# Patient Record
Sex: FEMALE | Race: BLACK OR AFRICAN AMERICAN | Employment: OTHER | URBAN - NONMETROPOLITAN AREA
[De-identification: names, ages, dates, MRNs, and addresses within clinical notes are randomized per-mention and may not be internally consistent; named-entity substitution may affect disease eponyms.]

---

## 2020-09-19 ENCOUNTER — HOSPITAL ENCOUNTER (EMERGENCY)
Age: 60
Discharge: HOME | End: 2020-09-19
Attending: EMERGENCY MEDICINE
Payer: MEDICAID

## 2020-09-19 VITALS
RESPIRATION RATE: 16 BRPM | TEMPERATURE: 97.6 F | WEIGHT: 178.4 LBS | SYSTOLIC BLOOD PRESSURE: 176 MMHG | HEIGHT: 65 IN | DIASTOLIC BLOOD PRESSURE: 96 MMHG | OXYGEN SATURATION: 99 % | HEART RATE: 85 BPM | BODY MASS INDEX: 29.72 KG/M2

## 2020-09-19 DIAGNOSIS — J01.11 ACUTE RECURRENT FRONTAL SINUSITIS: Primary | ICD-10-CM

## 2020-09-19 PROCEDURE — 99282 EMERGENCY DEPT VISIT SF MDM: CPT

## 2020-09-19 RX ORDER — OMEPRAZOLE 20 MG/1
20 CAPSULE, DELAYED RELEASE ORAL
COMMUNITY

## 2020-09-19 RX ORDER — GLIPIZIDE 2.5 MG/1
2.5 TABLET, EXTENDED RELEASE ORAL DAILY
COMMUNITY
End: 2021-12-07

## 2020-09-19 RX ORDER — LOSARTAN POTASSIUM 100 MG/1
100 TABLET ORAL DAILY
COMMUNITY

## 2020-09-19 RX ORDER — EZETIMIBE 10 MG/1
10 TABLET ORAL DAILY
COMMUNITY

## 2020-09-19 RX ORDER — ACARBOSE 100 MG/1
100 TABLET ORAL
COMMUNITY

## 2020-09-19 RX ORDER — ATORVASTATIN CALCIUM 80 MG/1
80 TABLET, FILM COATED ORAL DAILY
COMMUNITY

## 2020-09-19 NOTE — ED PROVIDER NOTES
Patient states she has been bothered with sinus situs for over a week. Postnasal drip and some scratchy feeling in the back of her throat. Sinus Pain    Associated symptoms include congestion. Pertinent negatives include no ear pain, no sinus pressure, no sore throat, no cough, no shortness of breath, no rhinorrhea and no chest pain. Past Medical History:   Diagnosis Date    Allergies     Diabetes ([address]     Hypercholesteremia     Hypertension        Past Surgical History:   Procedure Laterality Date    HX BACK SURGERY      HX  SECTION           No family history on file.     Social History     Socioeconomic History    Marital status: SINGLE     Spouse name: Not on file    Number of children: Not on file    Years of education: Not on file    Highest education level: Not on file   Occupational History    Not on file   Social Needs    Financial resource strain: Not on file    Food insecurity     Worry: Not on file     Inability: Not on file    Transportation needs     Medical: Not on file     Non-medical: Not on file   Tobacco Use    Smoking status: Never Smoker    Smokeless tobacco: Never Used   Substance and Sexual Activity    Alcohol use: Not Currently    Drug use: Never    Sexual activity: Not on file   Lifestyle    Physical activity     Days per week: Not on file     Minutes per session: Not on file    Stress: Not on file   Relationships    Social connections     Talks on phone: Not on file     Gets together: Not on file     Attends Scientology service: Not on file     Active member of club or organization: Not on file     Attends meetings of clubs or organizations: Not on file     Relationship status: Not on file    Intimate partner violence     Fear of current or ex partner: Not on file     Emotionally abused: Not on file     Physically abused: Not on file     Forced sexual activity: Not on file   Other Topics Concern    Not on file   Social History Narrative    Not on file         ALLERGIES: Amoxicillin    Review of Systems   Constitutional: Negative. HENT: Positive for congestion and sinus pain. Negative for dental problem, drooling, ear discharge, ear pain, facial swelling, hearing loss, mouth sores, nosebleeds, postnasal drip, rhinorrhea, sinus pressure, sneezing, sore throat, tinnitus, trouble swallowing and voice change. Eyes: Negative for photophobia, pain, discharge, redness, itching and visual disturbance. Respiratory: Negative for apnea, cough, choking, chest tightness, shortness of breath, wheezing and stridor. Cardiovascular: Negative. Negative for chest pain, palpitations and leg swelling. Gastrointestinal: Negative. Endocrine: Negative. Genitourinary: Negative. Musculoskeletal: Negative. Skin: Negative. Allergic/Immunologic: Negative. Neurological: Negative. Hematological: Negative. Psychiatric/Behavioral: Negative. Vitals:    09/19/20 1103   BP: (!) 176/96   Pulse: 85   Resp: 16   Temp: 97.6 °F (36.4 °C)   SpO2: 99%   Weight: 80.9 kg (178 lb 6.4 oz)   Height: 5' 5\" (1.651 m)            Physical Exam  Vitals signs and nursing note reviewed. Constitutional:       Appearance: Normal appearance. HENT:      Head: Normocephalic. Right Ear: Tympanic membrane normal.      Left Ear: Tympanic membrane normal.      Nose: Congestion present. No rhinorrhea. Mouth/Throat:      Mouth: Mucous membranes are moist.      Pharynx: Oropharynx is clear. Eyes:      Extraocular Movements: Extraocular movements intact. Pupils: Pupils are equal, round, and reactive to light. Neck:      Musculoskeletal: Normal range of motion and neck supple. Cardiovascular:      Rate and Rhythm: Normal rate. Pulses: Normal pulses. Pulmonary:      Effort: Pulmonary effort is normal.   Abdominal:      General: Abdomen is flat. Musculoskeletal: Normal range of motion. Skin:     General: Skin is warm.       Capillary Refill: Capillary refill takes less than 2 seconds. Neurological:      Mental Status: She is alert and oriented to person, place, and time.    Psychiatric:         Mood and Affect: Mood normal.          MDM         Procedures

## 2020-11-19 ENCOUNTER — TRANSCRIBE ORDER (OUTPATIENT)
Dept: SCHEDULING | Age: 60
End: 2020-11-19

## 2020-11-19 DIAGNOSIS — Z12.31 VISIT FOR SCREENING MAMMOGRAM: Primary | ICD-10-CM

## 2021-05-15 ENCOUNTER — HOSPITAL ENCOUNTER (EMERGENCY)
Age: 61
Discharge: HOME HEALTH | End: 2021-05-15
Attending: EMERGENCY MEDICINE
Payer: MEDICARE

## 2021-05-15 VITALS
HEART RATE: 80 BPM | OXYGEN SATURATION: 100 % | TEMPERATURE: 97.9 F | SYSTOLIC BLOOD PRESSURE: 193 MMHG | RESPIRATION RATE: 17 BRPM | DIASTOLIC BLOOD PRESSURE: 94 MMHG

## 2021-05-15 DIAGNOSIS — R51.9 HEADACHE BEHIND THE EYES: Primary | ICD-10-CM

## 2021-05-15 PROCEDURE — 99283 EMERGENCY DEPT VISIT LOW MDM: CPT

## 2021-05-15 RX ORDER — ACETAMINOPHEN 500 MG
500 TABLET ORAL
Status: DISCONTINUED | OUTPATIENT
Start: 2021-05-15 | End: 2021-05-15 | Stop reason: HOSPADM

## 2021-05-15 RX ORDER — BISACODYL 5 MG
5 TABLET, DELAYED RELEASE (ENTERIC COATED) ORAL DAILY PRN
COMMUNITY
End: 2021-12-07

## 2021-05-15 NOTE — ED TRIAGE NOTES
Pt reports HA that started after getting a new eyeglass RX a couple of days ago. Pt also c/o HTN. Pt states she takes her BP meds daily. Pt denies any other symptoms.

## 2021-05-15 NOTE — ED PROVIDER NOTES
HPI     Patient is a 64 y.o. female DECLINED  BLACK/ who presents to the ER with a chief complaint of headache since when she got new glasses. She plan to see the doctor Monday. Patient has HTN. Patient denies SOB and CP. Patient denies hx of headache. Patient reports she has been stressing lately. 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 Chief Complaint   Patient presents with    Headache      Past Medical History:   Diagnosis Date    Allergies     [address] [address]     Hypercholesteremia     Hypertension        Past Surgical History:   Procedure Laterality Date    HX BACK SURGERY      HX  SECTION           History reviewed. No pertinent family history.     Social History     Socioeconomic History    Marital status: SINGLE     Spouse name: Not on file    Number of children: Not on file    Years of education: Not on file    Highest education level: Not on file   Occupational History    Not on file   Social Needs    Financial resource strain: Not on file    Food insecurity     Worry: Not on file     Inability: Not on file    Transportation needs     Medical: Not on file     Non-medical: Not on file   Tobacco Use    Smoking status: Never Smoker    Smokeless tobacco: Never Used   Substance and Sexual Activity    Alcohol use: Not Currently    Drug use: Never    Sexual activity: Not on file   Lifestyle    Physical activity     Days per week: Not on file     Minutes per session: Not on file    Stress: Not on file   Relationships    Social connections     Talks on phone: Not on file     Gets together: Not on file     Attends Worship service: Not on file     Active member of club or organization: Not on file     Attends meetings of clubs or organizations: Not on file     Relationship status: Not on file    Intimate partner violence     Fear of current or ex partner: Not on file     Emotionally abused: Not on file     Physically abused: Not on file     Forced sexual activity: Not on file   Other Topics Concern    Not on file   Social History Narrative    Not on file         ALLERGIES: Amoxicillin    Review of Systems   Constitutional: Negative. HENT: Negative. Eyes: Positive for itching. Respiratory: Negative. Cardiovascular: Negative. Gastrointestinal: Negative. Endocrine: Negative. Genitourinary: Negative. Musculoskeletal: Negative. Skin: Negative. Allergic/Immunologic: Negative. Neurological: Positive for headaches. Hematological: Negative. Psychiatric/Behavioral: Negative. Vitals:    05/15/21 1726   BP: (!) 193/94   Pulse: 80   Resp: 17   Temp: 97.9 °F (36.6 °C)   SpO2: 100%            Physical Exam  Vitals signs and nursing note reviewed. Constitutional:       Appearance: Normal appearance. She is normal weight. HENT:      Head: Normocephalic. Nose: Nose normal.   Eyes:      Pupils: Pupils are equal, round, and reactive to light. Neck:      Musculoskeletal: Normal range of motion. Cardiovascular:      Rate and Rhythm: Normal rate and regular rhythm. Pulmonary:      Effort: Pulmonary effort is normal.   Abdominal:      General: Abdomen is flat. Bowel sounds are normal.      Palpations: Abdomen is soft. Musculoskeletal: Normal range of motion. Skin:     General: Skin is warm. Capillary Refill: Capillary refill takes less than 2 seconds. Neurological:      General: No focal deficit present. Mental Status: She is alert. Psychiatric:         Mood and Affect: Mood normal.     Review of Records: nurses notes. MDM       Procedures    ICD-10-CM ICD-9-CM    1. Headache behind the eyes  R51.9 784.0      Dx 1. headaches    2.  Essential HTN    Plan: patient discharged home with follow-up with MD and eye doctor on MOnday

## 2021-05-17 ENCOUNTER — HOSPITAL ENCOUNTER (EMERGENCY)
Age: 61
Discharge: HOME | End: 2021-05-17
Attending: EMERGENCY MEDICINE
Payer: MEDICAID

## 2021-05-17 ENCOUNTER — APPOINTMENT (OUTPATIENT)
Dept: CT IMAGING | Age: 61
End: 2021-05-17
Attending: EMERGENCY MEDICINE
Payer: MEDICAID

## 2021-05-17 VITALS
OXYGEN SATURATION: 99 % | HEART RATE: 62 BPM | DIASTOLIC BLOOD PRESSURE: 74 MMHG | RESPIRATION RATE: 16 BRPM | TEMPERATURE: 98.4 F | SYSTOLIC BLOOD PRESSURE: 116 MMHG | HEIGHT: 65 IN | BODY MASS INDEX: 29.66 KG/M2 | WEIGHT: 178 LBS

## 2021-05-17 DIAGNOSIS — F41.1 ANXIETY STATE: ICD-10-CM

## 2021-05-17 DIAGNOSIS — I10 ESSENTIAL HYPERTENSION: ICD-10-CM

## 2021-05-17 DIAGNOSIS — R42 DIZZINESS: Primary | ICD-10-CM

## 2021-05-17 LAB
ALBUMIN SERPL-MCNC: 4 G/DL (ref 3.5–5)
ALBUMIN/GLOB SERPL: 0.9 {RATIO} (ref 1.1–2.2)
ALP SERPL-CCNC: 131 U/L (ref 45–117)
ALT SERPL-CCNC: 28 U/L (ref 12–78)
ANION GAP SERPL CALC-SCNC: 7 MMOL/L (ref 5–15)
APPEARANCE UR: CLEAR
AST SERPL W P-5'-P-CCNC: 21 U/L (ref 15–37)
BASOPHILS # BLD: 0 K/UL (ref 0–0.2)
BASOPHILS NFR BLD: 0 % (ref 0–2.5)
BILIRUB SERPL-MCNC: 0.8 MG/DL (ref 0.2–1)
BILIRUB UR QL: NEGATIVE
BUN SERPL-MCNC: 14 MG/DL (ref 6–20)
BUN/CREAT SERPL: 12 (ref 12–20)
CA-I BLD-MCNC: 9.8 MG/DL (ref 8.5–10.1)
CHLORIDE SERPL-SCNC: 103 MMOL/L (ref 97–108)
CO2 SERPL-SCNC: 30 MMOL/L (ref 21–32)
COLOR UR: NORMAL
CREAT SERPL-MCNC: 1.15 MG/DL (ref 0.55–1.02)
EOSINOPHIL # BLD: 0.1 K/UL (ref 0–0.7)
EOSINOPHIL NFR BLD: 2 % (ref 0.9–2.9)
ERYTHROCYTE [DISTWIDTH] IN BLOOD BY AUTOMATED COUNT: 15 % (ref 11.5–14.5)
GLOBULIN SER CALC-MCNC: 4.3 G/DL (ref 2–4)
GLUCOSE SERPL-MCNC: 127 MG/DL (ref 65–100)
GLUCOSE UR STRIP.AUTO-MCNC: NEGATIVE MG/DL
HCT VFR BLD AUTO: 35.4 % (ref 36–46)
HGB BLD-MCNC: 11.8 G/DL (ref 13.5–17.5)
HGB UR QL STRIP: NEGATIVE
KETONES UR QL STRIP.AUTO: NEGATIVE MG/DL
LEUKOCYTE ESTERASE UR QL STRIP.AUTO: NEGATIVE
LYMPHOCYTES # BLD: 3.5 K/UL (ref 1–4.8)
LYMPHOCYTES NFR BLD: 47 % (ref 20.5–51.1)
MCH RBC QN AUTO: 29.8 PG (ref 31–34)
MCHC RBC AUTO-ENTMCNC: 33.3 G/DL (ref 31–36)
MCV RBC AUTO: 89.6 FL (ref 80–100)
MONOCYTES # BLD: 0.8 K/UL (ref 0.2–2.4)
MONOCYTES NFR BLD: 11 % (ref 1.7–9.3)
NEUTS SEG # BLD: 2.9 K/UL (ref 1.8–7.7)
NEUTS SEG NFR BLD: 40 % (ref 42–75)
NITRITE UR QL STRIP.AUTO: NEGATIVE
NRBC # BLD: 0.01 K/UL
NRBC BLD-RTO: 0.1 PER 100 WBC
PH UR STRIP: 6 [PH] (ref 5–8)
PLATELET # BLD AUTO: 200 K/UL (ref 150–400)
PMV BLD AUTO: 8.4 FL (ref 6.5–11.5)
POTASSIUM SERPL-SCNC: 3.6 MMOL/L (ref 3.5–5.1)
PROT SERPL-MCNC: 8.3 G/DL (ref 6.4–8.2)
PROT UR STRIP-MCNC: NEGATIVE MG/DL
RBC # BLD AUTO: 3.96 M/UL (ref 4.5–5.9)
SODIUM SERPL-SCNC: 140 MMOL/L (ref 136–145)
SP GR UR REFRACTOMETRY: 1.02 (ref 1–1.03)
TROPONIN I SERPL-MCNC: <0.05 NG/ML
UROBILINOGEN UR QL STRIP.AUTO: 0.2 EU/DL (ref 0.2–1)
WBC # BLD AUTO: 7.3 K/UL (ref 4.4–11.3)

## 2021-05-17 PROCEDURE — 85025 COMPLETE CBC W/AUTO DIFF WBC: CPT

## 2021-05-17 PROCEDURE — 96374 THER/PROPH/DIAG INJ IV PUSH: CPT

## 2021-05-17 PROCEDURE — 74011250637 HC RX REV CODE- 250/637: Performed by: EMERGENCY MEDICINE

## 2021-05-17 PROCEDURE — 80053 COMPREHEN METABOLIC PANEL: CPT

## 2021-05-17 PROCEDURE — 74011250636 HC RX REV CODE- 250/636: Performed by: EMERGENCY MEDICINE

## 2021-05-17 PROCEDURE — 81003 URINALYSIS AUTO W/O SCOPE: CPT

## 2021-05-17 PROCEDURE — 84484 ASSAY OF TROPONIN QUANT: CPT

## 2021-05-17 PROCEDURE — 99284 EMERGENCY DEPT VISIT MOD MDM: CPT

## 2021-05-17 PROCEDURE — 93005 ELECTROCARDIOGRAM TRACING: CPT

## 2021-05-17 RX ORDER — FLUTICASONE PROPIONATE 50 MCG
2 SPRAY, SUSPENSION (ML) NASAL DAILY
COMMUNITY

## 2021-05-17 RX ORDER — LORAZEPAM 2 MG/ML
1 INJECTION INTRAMUSCULAR
Status: COMPLETED | OUTPATIENT
Start: 2021-05-17 | End: 2021-05-17

## 2021-05-17 RX ORDER — HYDRALAZINE HYDROCHLORIDE 25 MG/1
50 TABLET, FILM COATED ORAL
Status: COMPLETED | OUTPATIENT
Start: 2021-05-17 | End: 2021-05-17

## 2021-05-17 RX ORDER — ERGOCALCIFEROL 1.25 MG/1
50000 CAPSULE ORAL
COMMUNITY

## 2021-05-17 RX ORDER — GUAIFENESIN 100 MG/5ML
81 LIQUID (ML) ORAL DAILY
COMMUNITY
End: 2022-09-03

## 2021-05-17 RX ORDER — LISINOPRIL 10 MG/1
10 TABLET ORAL
Status: COMPLETED | OUTPATIENT
Start: 2021-05-17 | End: 2021-05-17

## 2021-05-17 RX ORDER — CLONIDINE HYDROCHLORIDE 0.1 MG/1
0.1 TABLET ORAL
Status: COMPLETED | OUTPATIENT
Start: 2021-05-17 | End: 2021-05-17

## 2021-05-17 RX ORDER — LORATADINE 10 MG/1
10 TABLET ORAL
COMMUNITY

## 2021-05-17 RX ORDER — DOCUSATE SODIUM 100 MG/1
100 CAPSULE, LIQUID FILLED ORAL
COMMUNITY

## 2021-05-17 RX ADMIN — LORAZEPAM 1 MG: 2 INJECTION INTRAMUSCULAR; INTRAVENOUS at 21:46

## 2021-05-17 RX ADMIN — CLONIDINE HYDROCHLORIDE 0.1 MG: 0.1 TABLET ORAL at 21:45

## 2021-05-17 RX ADMIN — HYDRALAZINE HYDROCHLORIDE 50 MG: 25 TABLET, FILM COATED ORAL at 21:45

## 2021-05-17 RX ADMIN — LISINOPRIL 10 MG: 10 TABLET ORAL at 21:12

## 2021-05-18 LAB
ATRIAL RATE: 91 BPM
CALCULATED P AXIS, ECG09: 66 DEGREES
CALCULATED R AXIS, ECG10: -20 DEGREES
CALCULATED T AXIS, ECG11: 65 DEGREES
DIAGNOSIS, 93000: NORMAL
P-R INTERVAL, ECG05: 180 MS
Q-T INTERVAL, ECG07: 361 MS
QRS DURATION, ECG06: 98 MS
QTC CALCULATION (BEZET), ECG08: 447 MS
VENTRICULAR RATE, ECG03: 92 BPM

## 2021-05-18 NOTE — ED NOTES
Patient refusing Head CT at this time. Dr. Velarde Esters at bedside to educate patient on importance of head CT and the risks of refusing head CT, that could include death. Patient still refusing head CT at this time.

## 2021-05-18 NOTE — ED TRIAGE NOTES
Patient presents to ED reporting hypertension and dizziness. Patient was seen here two days ago for the same. Patient denies chest pain, shortness of breath, and all other symptoms at this time. Patient is tearful and anxious appearing.

## 2021-05-18 NOTE — ED PROVIDER NOTES
Patient presents with complaint of dizziness and hypertension. She was seen 2 days ago for same , but reports worsening symptoms in past 24 hours. No fever , chills chest pain or shortness of breath. Duration:  3 days    Intensity: severe    Modified by: nothing               Past Medical History:   Diagnosis Date    Allergies     Diabetes ([address]     Hypercholesteremia     Hypertension        Past Surgical History:   Procedure Laterality Date    HX BACK SURGERY      HX  SECTION           History reviewed. No pertinent family history.     Social History     Socioeconomic History    Marital status: SINGLE     Spouse name: Not on file    Number of children: Not on file    Years of education: Not on file    Highest education level: Not on file   Occupational History    Not on file   Social Needs    Financial resource strain: Not on file    Food insecurity     Worry: Not on file     Inability: Not on file    Transportation needs     Medical: Not on file     Non-medical: Not on file   Tobacco Use    Smoking status: Never Smoker    Smokeless tobacco: Never Used   Substance and Sexual Activity    Alcohol use: Not Currently    Drug use: Never    Sexual activity: Not on file   Lifestyle    Physical activity     Days per week: Not on file     Minutes per session: Not on file    Stress: Not on file   Relationships    Social connections     Talks on phone: Not on file     Gets together: Not on file     Attends Spiritism service: Not on file     Active member of club or organization: Not on file     Attends meetings of clubs or organizations: Not on file     Relationship status: Not on file    Intimate partner violence     Fear of current or ex partner: Not on file     Emotionally abused: Not on file     Physically abused: Not on file     Forced sexual activity: Not on file   Other Topics Concern    Not on file   Social History Narrative    Not on file         ALLERGIES: Nsaids (non-steroidal anti-inflammatory drug) and Amoxicillin    Review of Systems   Constitutional: Positive for diaphoresis, fatigue and fever. HENT: Negative. Eyes: Negative. Respiratory: Positive for chest tightness and shortness of breath. Cardiovascular: Positive for chest pain. Gastrointestinal: Positive for abdominal pain, nausea and vomiting. Endocrine: Negative. Genitourinary: Negative. Skin: Negative. Allergic/Immunologic: Negative. Neurological: Positive for dizziness. Hematological: Negative. Psychiatric/Behavioral: Negative. All other systems reviewed and are negative. Vitals:    05/17/21 2049   BP: (!) 178/83   Pulse: 80   Resp: 18   Temp: 98.4 °F (36.9 °C)   SpO2: 100%   Weight: 80.7 kg (178 lb)   Height: 5' 5\" (1.651 m)            Physical Exam  Vitals signs and nursing note reviewed. Constitutional:       Appearance: She is well-developed. HENT:      Head: Normocephalic and atraumatic. Neck:      Musculoskeletal: Normal range of motion and neck supple. Cardiovascular:      Rate and Rhythm: Normal rate and regular rhythm. Heart sounds: Normal heart sounds. Pulmonary:      Breath sounds: Normal breath sounds. Abdominal:      General: Bowel sounds are normal.      Palpations: Abdomen is soft. Musculoskeletal: Normal range of motion. Neurological:      General: No focal deficit present. Mental Status: She is alert. Psychiatric:         Mood and Affect: Mood normal.         Behavior: Behavior normal.          MDM  Number of Diagnoses or Management Options  Risk of Complications, Morbidity, and/or Mortality  Presenting problems: moderate  Diagnostic procedures: moderate  Management options: moderate  General comments: EKG : sinus rhythm 92 LAD. No acute ST changes.       Patient Progress  Patient progress: stable         Procedures

## 2021-05-25 ENCOUNTER — HOSPITAL ENCOUNTER (EMERGENCY)
Age: 61
Discharge: HOME | End: 2021-05-25
Attending: EMERGENCY MEDICINE
Payer: MEDICARE

## 2021-05-25 VITALS
DIASTOLIC BLOOD PRESSURE: 78 MMHG | SYSTOLIC BLOOD PRESSURE: 143 MMHG | TEMPERATURE: 98 F | HEART RATE: 60 BPM | OXYGEN SATURATION: 100 % | RESPIRATION RATE: 14 BRPM

## 2021-05-25 DIAGNOSIS — I10 HYPERTENSION, UNSPECIFIED TYPE: ICD-10-CM

## 2021-05-25 DIAGNOSIS — F41.1 ANXIETY STATE: Primary | ICD-10-CM

## 2021-05-25 LAB
ANION GAP SERPL CALC-SCNC: 9 MMOL/L (ref 5–15)
BASOPHILS # BLD: 0 K/UL (ref 0–0.2)
BASOPHILS NFR BLD: 1 % (ref 0–2.5)
BUN SERPL-MCNC: 18 MG/DL (ref 6–20)
BUN/CREAT SERPL: 16 (ref 12–20)
CA-I BLD-MCNC: 9.4 MG/DL (ref 8.5–10.1)
CHLORIDE SERPL-SCNC: 101 MMOL/L (ref 97–108)
CO2 SERPL-SCNC: 29 MMOL/L (ref 21–32)
CREAT SERPL-MCNC: 1.15 MG/DL (ref 0.55–1.02)
EOSINOPHIL # BLD: 0.1 K/UL (ref 0–0.7)
EOSINOPHIL NFR BLD: 2 % (ref 0.9–2.9)
ERYTHROCYTE [DISTWIDTH] IN BLOOD BY AUTOMATED COUNT: 14.5 % (ref 11.5–14.5)
GLUCOSE SERPL-MCNC: 140 MG/DL (ref 65–100)
HCT VFR BLD AUTO: 35 % (ref 36–46)
HGB BLD-MCNC: 11.7 G/DL (ref 13.5–17.5)
LYMPHOCYTES # BLD: 2.5 K/UL (ref 1–4.8)
LYMPHOCYTES NFR BLD: 47 % (ref 20.5–51.1)
MCH RBC QN AUTO: 29.8 PG (ref 31–34)
MCHC RBC AUTO-ENTMCNC: 33.5 G/DL (ref 31–36)
MCV RBC AUTO: 88.9 FL (ref 80–100)
MONOCYTES # BLD: 0.5 K/UL (ref 0.2–2.4)
MONOCYTES NFR BLD: 10 % (ref 1.7–9.3)
NEUTS SEG # BLD: 2.1 K/UL (ref 1.8–7.7)
NEUTS SEG NFR BLD: 40 % (ref 42–75)
NRBC # BLD: 0.01 K/UL
NRBC BLD-RTO: 0.2 PER 100 WBC
PLATELET # BLD AUTO: 190 K/UL (ref 150–400)
PMV BLD AUTO: 8.6 FL (ref 6.5–11.5)
POTASSIUM SERPL-SCNC: 4.2 MMOL/L (ref 3.5–5.1)
RBC # BLD AUTO: 3.93 M/UL (ref 4.5–5.9)
SODIUM SERPL-SCNC: 139 MMOL/L (ref 136–145)
WBC # BLD AUTO: 5.2 K/UL (ref 4.4–11.3)

## 2021-05-25 PROCEDURE — 80048 BASIC METABOLIC PNL TOTAL CA: CPT

## 2021-05-25 PROCEDURE — 85025 COMPLETE CBC W/AUTO DIFF WBC: CPT

## 2021-05-25 PROCEDURE — 99284 EMERGENCY DEPT VISIT MOD MDM: CPT

## 2021-05-25 PROCEDURE — 36415 COLL VENOUS BLD VENIPUNCTURE: CPT

## 2021-05-25 NOTE — ED TRIAGE NOTES
Pt arrives c/o hypertension at home, systolic 568'K. Pt states had some dizzness at time of onset, resolved. Now asymptomatic, concerned about elevated pressure.

## 2021-05-26 NOTE — ED NOTES
Patient given and verbalized understanding of all discharge, medication, and follow-up instructions. Patient departed ED ambulatory, in good condition, with family member.

## 2021-05-26 NOTE — ED PROVIDER NOTES
EMERGENCY DEPARTMENT HISTORY AND PHYSICAL EXAM  ?    Date: 5/25/2021  Patient Name: Aba Hartley    History of Presenting Illness    Patient presents with:  Hypertension: pt reports blood pressure 215A systolic, mild dizziness at time of onset, resolved. History Provided By: Patient    HPI: Aba Hartley, 64 y.o. female with a past medical history significant for diabetes, hypertension and hyperlipidemia presents to the ED with cc of elevated blood pressures and dizziness at home. She says she is very anxious about her blood pressure. She feels better here. She recently started loratadine for allergies. She endorses medication compliance. There are no other complaints, changes, or physical findings at this time. PCP: Nneka Hinson MD    No current facility-administered medications on file prior to encounter. Current Outpatient Medications on File Prior to Encounter:  docusate sodium (Colace) 100 mg capsule, Take 100 mg by mouth nightly., Disp: , Rfl:   ergocalciferol (Vitamin D2) 1,250 mcg (50,000 unit) capsule, Take 50,000 Units by mouth every seven (7) days. Takes on Thursdays, Disp: , Rfl:   aspirin 81 mg chewable tablet, Take 81 mg by mouth daily. , Disp: , Rfl:   fluticasone propionate (FLONASE) 50 mcg/actuation nasal spray, 2 Sprays by Both Nostrils route daily. Indications: chronic stuffy and runny nose not caused by allergies, Disp: , Rfl:   loratadine (Claritin) 10 mg tablet, Take 10 mg by mouth., Disp: , Rfl:   bisacodyL (DULCOLAX) 5 mg EC tablet, Take 5 mg by mouth daily as needed. , Disp: , Rfl:   fluticasone propionate (FLOVENT DISKUS) 50 mcg/actuation inhaler, Take 2 Puffs by inhalation daily. , Disp: , Rfl:   omeprazole (PRILOSEC) 20 mg capsule, Take 20 mg by mouth daily as needed. , Disp: , Rfl:   acarbose (PRECOSE) 100 mg tablet, Take 100 mg by mouth three (3) times daily (with meals). , Disp: , Rfl:   losartan (COZAAR) 100 mg tablet, Take 100 mg by mouth daily. , Disp: , Rfl:   ezetimibe (ZETIA) 10 mg tablet, Take 10 mg by mouth daily. , Disp: , Rfl:   atorvastatin (LIPITOR) 80 mg tablet, Take 80 mg by mouth daily. , Disp: , Rfl:   glipiZIDE SR (GLUCOTROL XL) 2.5 mg CR tablet, Take 2.5 mg by mouth daily. , Disp: , Rfl:         Past History    Past Medical History:  Past Medical History:  No date: Allergies  No date: Diabetes ([address]  No date: Hypercholesteremia  No date: Hypertension    Past Surgical History:  Past Surgical History:  No date: HX BACK SURGERY  No date: HX  SECTION    Family History:  History reviewed. No pertinent family history.       Social History:  Social History   Tobacco Use     Smoking status: Never Smoker     Smokeless tobacco: Never Used   Alcohol use: Not Currently   Drug use: Never      Allergies:  -- Nsaids (Non-Steroidal Anti-Inflammatory Drug) -- Unable to Obtain  -- Amoxicillin -- Hives      Review of Systems  [unfilled]    [facility] Exam  [email or web address]    Diagnostic Study Results    Labs -  Recent Results (from the past 12 hour(s))  -CBC WITH AUTOMATED DIFF  Collection Time: 21  8:00 PM      Result                      Value             Ref Range          WBC                         5.2               4.4 - 11.3 K*      RBC                         3.93 (L)          4.50 - 5.90 *      HGB                         11.7 (L)          13.5 - 17.5 *      HCT                         35.0 (L)          36 - 46 %          MCV                         88.9              80 - 100 FL        MCH                         29.8 (L)          31 - 34 PG         MCHC                        33.5              31.0 - 36.0 *      RDW                         14.5              11.5 - 14.5 %      PLATELET                    190               150 - 400 K/*      MPV                         8.6               6.5 - 11.5 FL      NRBC                        0.2                WBC        ABSOLUTE NRBC               0.01              K/uL               NEUTROPHILS                 40 (L) 42 - 75 %          LYMPHOCYTES                 47                20.5 - 51.1 %      MONOCYTES                   10 (H)            1.7 - 9.3 %        EOSINOPHILS                 2                 0.9 - 2.9 %        BASOPHILS                   1                 0.0 - 2.5 %        ABS. NEUTROPHILS            2.1               1.8 - 7.7 K/*      ABS. LYMPHOCYTES            2.5               1.0 - 4.8 K/*      ABS. MONOCYTES              0.5               0.2 - 2.4 K/*      ABS. EOSINOPHILS            0.1               0.0 - 0.7 K/*      ABS. BASOPHILS              0.0               0.0 - 0.2 K/*  -METABOLIC PANEL, BASIC  Collection Time: 05/25/21  8:00 PM      Result                      Value             Ref Range          Sodium                      139               136 - 145 mm*      Potassium                   4.2               3.5 - 5.1 mm*      Chloride                    101               97 - 108 mmo*      CO2                         29                21 - 32 mmol*      Anion gap                   9                 5 - 15 mmol/L      Glucose                     140 (H)           65 - 100 mg/*      BUN                         18                6 - 20 mg/dL       Creatinine                  1.15 (H)          0.55 - 1.02 *      BUN/Creatinine ratio        16                12 - 20            GFR est AA                  58 (L)            >60 ml/min/1*      GFR est non-AA              48 (L)            >60 ml/min/1*      Calcium                     9.4               8.5 - 10.1 m*    Radiologic Studies -   No orders to display  CT Results  (Last 48 hours)   None     CXR Results  (Last 48 hours)   None         Medical Decision Making  I am the first provider for this patient. I reviewed the vital signs, available nursing notes, past medical history, past surgical history, family history and social history. Vital Signs-Reviewed the patient's vital signs. Empty flowsheet group.       Records Reviewed: Nursing Notes    Provider Notes (Medical Decision Making):   Blood pressure is moderately elevated here. Will check chemistry. I asked her to lay back and relax and will repeat BP measurements. ED Course:   Blood pressure normalized. Initial assessment performed. The patients presenting problems have been discussed, and they are in agreement with the care plan formulated and outlined with them. I have encouraged them to ask questions as they arise throughout their visit. PLAN:  1. Current Discharge Medication List      2. Follow-up Information    Follow up With Specialties Details Why Olimpia Ga MD Internal Medicine Call  As needed [address] [facility] [address]  [phone]      Return to ED if worse     Diagnosis    Clinical Impression: Anxiety state  (primary encounter diagnosis)  Hypertension, unspecified type      ? Past Medical History:   Diagnosis Date    Allergies     Diabetes ([address]     Hypercholesteremia     Hypertension        Past Surgical History:   Procedure Laterality Date    HX BACK SURGERY      HX  SECTION           History reviewed. No pertinent family history.     Social History     Socioeconomic History    Marital status: SINGLE     Spouse name: Not on file    Number of children: Not on file    Years of education: Not on file    Highest education level: Not on file   Occupational History    Not on file   Tobacco Use    Smoking status: Never Smoker    Smokeless tobacco: Never Used   Substance and Sexual Activity    Alcohol use: Not Currently    Drug use: Never    Sexual activity: Not on file   Other Topics Concern    Not on file   Social History Narrative    Not on file     Social Determinants of Health     Financial Resource Strain:     Difficulty of Paying Living Expenses:    Food Insecurity:     Worried About Running Out of Food in the Last Year:     [address] in the Last Year:    Transportation Needs:     Lack of Transportation (Medical):  Lack of Transportation (Non-Medical):    Physical Activity:     Days of Exercise per Week:     Minutes of Exercise per Session:    Stress:     Feeling of Stress :    Social Connections:     Frequency of Communication with Friends and Family:     Frequency of Social Gatherings with Friends and Family:     Attends [employer] Services:     Active Member of Clubs or Organizations:     Attends Club or Organization Meetings:     Marital Status:    Intimate Partner Violence:     Fear of Current or Ex-Partner:     Emotionally Abused:     Physically Abused:     Sexually Abused: ALLERGIES: Nsaids (non-steroidal anti-inflammatory drug) and Amoxicillin    Review of Systems   Constitutional: Negative. HENT: Negative. Eyes: Negative. Respiratory: Negative. Cardiovascular: Negative. Gastrointestinal: Negative. Endocrine: Negative. Genitourinary: Negative. Musculoskeletal: Negative. Neurological: Positive for dizziness. Hematological: Negative. Psychiatric/Behavioral: Negative. Vitals:    05/25/21 1944 05/25/21 2003 05/25/21 2011 05/25/21 2045   BP: (!) 168/91 (!) 164/81 136/75 (!) 143/78   Pulse: 72 70 64 60   Resp: 16 16 14 14   Temp: 98 °F (36.7 °C)      SpO2: 100% 100% 100% 100%            Physical Exam  Vitals and nursing note reviewed. Constitutional:       Appearance: Normal appearance. HENT:      Head: Normocephalic and atraumatic. Right Ear: External ear normal.      Left Ear: External ear normal.      Nose: Nose normal.      Mouth/Throat:      Mouth: Mucous membranes are moist.      Pharynx: Oropharynx is clear. Eyes:      Extraocular Movements: Extraocular movements intact. Conjunctiva/sclera: Conjunctivae normal.      Pupils: Pupils are equal, round, and reactive to light. Cardiovascular:      Rate and Rhythm: Normal rate and regular rhythm. Pulses: Normal pulses.       Heart sounds: Normal heart sounds. Pulmonary:      Effort: Pulmonary effort is normal.      Breath sounds: Normal breath sounds. Abdominal:      General: Abdomen is flat. Bowel sounds are normal.      Palpations: Abdomen is soft. Musculoskeletal:         General: Normal range of motion. Cervical back: Normal range of motion and neck supple. Skin:     General: Skin is warm and dry. Neurological:      General: No focal deficit present. Mental Status: She is alert and oriented to person, place, and time.    Psychiatric:         Mood and Affect: Mood normal.         Behavior: Behavior normal.          MDM  Number of Diagnoses or Management Options     Amount and/or Complexity of Data Reviewed  Clinical lab tests: reviewed    Risk of Complications, Morbidity, and/or Mortality  Presenting problems: moderate  Diagnostic procedures: moderate  Management options: low    Patient Progress  Patient progress: stable         Procedures

## 2021-06-25 ENCOUNTER — HOSPITAL ENCOUNTER (EMERGENCY)
Age: 61
Discharge: HOME | End: 2021-06-25
Attending: EMERGENCY MEDICINE
Payer: MEDICARE

## 2021-06-25 VITALS
DIASTOLIC BLOOD PRESSURE: 64 MMHG | SYSTOLIC BLOOD PRESSURE: 168 MMHG | OXYGEN SATURATION: 98 % | TEMPERATURE: 98.7 F | RESPIRATION RATE: 20 BRPM | WEIGHT: 145 LBS | HEART RATE: 78 BPM | HEIGHT: 65 IN | BODY MASS INDEX: 24.16 KG/M2

## 2021-06-25 DIAGNOSIS — K05.00 GINGIVITIS, ACUTE: ICD-10-CM

## 2021-06-25 DIAGNOSIS — K04.7 DENTAL ABSCESS: Primary | ICD-10-CM

## 2021-06-25 PROCEDURE — 99283 EMERGENCY DEPT VISIT LOW MDM: CPT

## 2021-06-25 PROCEDURE — 74011250637 HC RX REV CODE- 250/637: Performed by: EMERGENCY MEDICINE

## 2021-06-25 RX ORDER — AZITHROMYCIN 250 MG/1
500 TABLET, FILM COATED ORAL
Status: COMPLETED | OUTPATIENT
Start: 2021-06-25 | End: 2021-06-25

## 2021-06-25 RX ORDER — AZITHROMYCIN 250 MG/1
250 TABLET, FILM COATED ORAL
Status: DISCONTINUED | OUTPATIENT
Start: 2021-06-30 | End: 2021-06-25

## 2021-06-25 RX ORDER — AZITHROMYCIN 250 MG/1
TABLET, FILM COATED ORAL
Qty: 5 TABLET | Refills: 0 | Status: SHIPPED | OUTPATIENT
Start: 2021-06-25 | End: 2022-09-03 | Stop reason: ALTCHOICE

## 2021-06-25 RX ADMIN — AZITHROMYCIN MONOHYDRATE 500 MG: 250 TABLET ORAL at 12:56

## 2021-06-25 NOTE — ED TRIAGE NOTES
.  Chief Complaint   Patient presents with    Dental Pain     pt presents with complaint of having abcess on upper left gumline.  states has been ongoing, has appointment with Dentist on July 1st.

## 2021-06-25 NOTE — ED PROVIDER NOTES
EMERGENCY DEPARTMENT HISTORY AND PHYSICAL EXAM      Date: 6/25/2021  Patient Name: Tedra Nageotte    History of Presenting Illness     Chief Complaint   Patient presents with    Dental Pain     pt presents with complaint of having abcess on upper left gumline. states has been ongoing, has appointment with Dentist on July 1st.        History Provided By: Patient    HPI: Tedra Nageotte, 64 y.o. female with a past medical history significant diabetes, hypertension and hyperlipidemia presents to the ED with cc of dental pain for intermittently for month but got streaty this week. Swelling left upper jaw. Pain scale 9/10 moderate. She took Tylenol without relieve. No fever or chills. Dental appointment 7/1/2021. There are no other complaints, changes, or physical findings at this time. PCP: Mary Abel MD    No current facility-administered medications on file prior to encounter. Current Outpatient Medications on File Prior to Encounter   Medication Sig Dispense Refill    docusate sodium (Colace) 100 mg capsule Take 100 mg by mouth nightly.  ergocalciferol (Vitamin D2) 1,250 mcg (50,000 unit) capsule Take 50,000 Units by mouth every seven (7) days. Takes on Thursdays      aspirin 81 mg chewable tablet Take 81 mg by mouth daily.  fluticasone propionate (FLONASE) 50 mcg/actuation nasal spray 2 Sprays by Both Nostrils route daily. Indications: chronic stuffy and runny nose not caused by allergies      loratadine (Claritin) 10 mg tablet Take 10 mg by mouth.  bisacodyL (DULCOLAX) 5 mg EC tablet Take 5 mg by mouth daily as needed.  fluticasone propionate (FLOVENT DISKUS) 50 mcg/actuation inhaler Take 2 Puffs by inhalation daily.  omeprazole (PRILOSEC) 20 mg capsule Take 20 mg by mouth daily as needed.  acarbose (PRECOSE) 100 mg tablet Take 100 mg by mouth three (3) times daily (with meals).  losartan (COZAAR) 100 mg tablet Take 100 mg by mouth daily.       ezetimibe (ZETIA) 10 mg tablet Take 10 mg by mouth daily.  atorvastatin (LIPITOR) 80 mg tablet Take 80 mg by mouth daily.  glipiZIDE SR (GLUCOTROL XL) 2.5 mg CR tablet Take 2.5 mg by mouth daily. Past History     Past Medical History:  Past Medical History:   Diagnosis Date    Allergies     Diabetes ([address]     Hypercholesteremia     Hypertension        Past Surgical History:  Past Surgical History:   Procedure Laterality Date    HX BACK SURGERY      HX  SECTION         Family History:  History reviewed. No pertinent family history. Social History:  Social History     Tobacco Use    Smoking status: Never Smoker    Smokeless tobacco: Never Used   Substance Use Topics    Alcohol use: Not Currently    Drug use: Never       Allergies: Allergies   Allergen Reactions    Nsaids (Non-Steroidal Anti-Inflammatory Drug) Unable to Obtain    Amoxicillin Hives         Review of Systems     Review of Systems   Constitutional: Negative. HENT: Positive for dental problem. Left upper jaw swelling   Eyes: Negative. Respiratory: Negative. Cardiovascular: Negative. Gastrointestinal: Negative. Endocrine: Negative. Genitourinary: Negative. Musculoskeletal: Negative. Skin: Negative. Allergic/Immunologic: Negative. Neurological: Negative. Hematological: Negative. Psychiatric/Behavioral: Negative. Physical Exam     Physical Exam  Vitals and nursing note reviewed. Constitutional:       Appearance: Normal appearance. HENT:      Head: Normocephalic. Right Ear: Tympanic membrane normal.      Left Ear: Tympanic membrane normal.      Nose: Nose normal.      Mouth/Throat:      Mouth: Mucous membranes are dry. Pharynx: Oropharynx is clear. Comments: Left upper gingival swelling  Tooth#14  Eyes:      Pupils: Pupils are equal, round, and reactive to light. Cardiovascular:      Rate and Rhythm: Normal rate. Pulses: Normal pulses.    Pulmonary:      Effort: Pulmonary effort is normal.   Abdominal:      General: Abdomen is flat. Palpations: Abdomen is soft. Musculoskeletal:         General: Normal range of motion. Cervical back: Normal range of motion and neck supple. Skin:     Capillary Refill: Capillary refill takes less than 2 seconds. Neurological:      General: No focal deficit present. Mental Status: She is alert. Psychiatric:         Mood and Affect: Mood normal.         Lab and Diagnostic Study Results     Labs -   No results found for this or any previous visit (from the past 12 hour(s)). Radiologic Studies -   @lastxrresult@  CT Results  (Last 48 hours)    None        CXR Results  (Last 48 hours)    None            Medical Decision Making   - I am the first provider for this patient. - I reviewed the vital signs, available nursing notes, past medical history, past surgical history, family history and social history. - Initial assessment performed. The patients presenting problems have been discussed, and they are in agreement with the care plan formulated and outlined with them. I have encouraged them to ask questions as they arise throughout their visit. Vital Signs-Reviewed the patient's vital signs. Patient Vitals for the past 12 hrs:   Temp Pulse Resp BP SpO2   06/25/21 1219 98.7 °F (37.1 °C) 78 20 (!) 168/64 100 %       Records Reviewed: Nursing Notes    The patient presents with dental pain with a differential diagnosis of dental pain, dental abscess, dental cavies,     ED Course:          Provider Notes (Medical Decision Making): MDM       Procedures   Medical Decision Makingedical Decision Making  Performed by: Joana Arias MD  PROCEDURESProcedures       Disposition   Disposition: Condition stable and improved  Discharge home with follow-up with dentist      DISCHARGE PLAN:  1.    Current Discharge Medication List      CONTINUE these medications which have NOT CHANGED    Details   docusate sodium (Colace) 100 mg capsule Take 100 mg by mouth nightly.      ergocalciferol (Vitamin D2) 1,250 mcg (50,000 unit) capsule Take 50,000 Units by mouth every seven (7) days. Takes on Thursdays      aspirin 81 mg chewable tablet Take 81 mg by mouth daily. fluticasone propionate (FLONASE) 50 mcg/actuation nasal spray 2 Sprays by Both Nostrils route daily. Indications: chronic stuffy and runny nose not caused by allergies      loratadine (Claritin) 10 mg tablet Take 10 mg by mouth.      bisacodyL (DULCOLAX) 5 mg EC tablet Take 5 mg by mouth daily as needed. fluticasone propionate (FLOVENT DISKUS) 50 mcg/actuation inhaler Take 2 Puffs by inhalation daily. omeprazole (PRILOSEC) 20 mg capsule Take 20 mg by mouth daily as needed. acarbose (PRECOSE) 100 mg tablet Take 100 mg by mouth three (3) times daily (with meals). losartan (COZAAR) 100 mg tablet Take 100 mg by mouth daily. ezetimibe (ZETIA) 10 mg tablet Take 10 mg by mouth daily. atorvastatin (LIPITOR) 80 mg tablet Take 80 mg by mouth daily. glipiZIDE SR (GLUCOTROL XL) 2.5 mg CR tablet Take 2.5 mg by mouth daily. 2.   Follow-up Information    None       3. Return to ED if worse   4. Current Discharge Medication List            Diagnosis     Clinical Impression: Kassandra Burton MD    Please note that this dictation was completed with [email or web address], the computer voice recognition software. Quite often unanticipated grammatical, syntax, homophones, and other interpretive errors are inadvertently transcribed by the computer software. Please disregard these errors. Please excuse any errors that have escaped final proofreading. Thank you.

## 2021-07-01 ENCOUNTER — TRANSCRIBE ORDER (OUTPATIENT)
Dept: SCHEDULING | Age: 61
End: 2021-07-01

## 2021-07-01 DIAGNOSIS — Z12.31 ENCOUNTER FOR SCREENING MAMMOGRAM FOR MALIGNANT NEOPLASM OF BREAST: Primary | ICD-10-CM

## 2021-07-02 ENCOUNTER — APPOINTMENT (OUTPATIENT)
Dept: GENERAL RADIOLOGY | Age: 61
End: 2021-07-02
Attending: EMERGENCY MEDICINE
Payer: MEDICARE

## 2021-07-02 ENCOUNTER — HOSPITAL ENCOUNTER (EMERGENCY)
Age: 61
Discharge: HOME | End: 2021-07-02
Attending: EMERGENCY MEDICINE
Payer: MEDICARE

## 2021-07-02 VITALS
DIASTOLIC BLOOD PRESSURE: 82 MMHG | OXYGEN SATURATION: 100 % | SYSTOLIC BLOOD PRESSURE: 137 MMHG | HEIGHT: 66 IN | TEMPERATURE: 97.8 F | RESPIRATION RATE: 18 BRPM | HEART RATE: 61 BPM | WEIGHT: 160 LBS | BODY MASS INDEX: 25.71 KG/M2

## 2021-07-02 DIAGNOSIS — S83.411A SPRAIN OF MEDIAL COLLATERAL LIGAMENT OF RIGHT KNEE, INITIAL ENCOUNTER: Primary | ICD-10-CM

## 2021-07-02 PROCEDURE — 74011250637 HC RX REV CODE- 250/637: Performed by: EMERGENCY MEDICINE

## 2021-07-02 PROCEDURE — 73560 X-RAY EXAM OF KNEE 1 OR 2: CPT

## 2021-07-02 PROCEDURE — 99282 EMERGENCY DEPT VISIT SF MDM: CPT

## 2021-07-02 RX ORDER — ACETAMINOPHEN 500 MG
500 TABLET ORAL
Status: COMPLETED | OUTPATIENT
Start: 2021-07-02 | End: 2021-07-02

## 2021-07-02 RX ADMIN — ACETAMINOPHEN 500 MG: 500 TABLET ORAL at 11:42

## 2021-07-02 NOTE — ED TRIAGE NOTES
.  Chief Complaint   Patient presents with    Leg Pain     pt presents ambulatory to triage with cane in place, stating that she fell down stairs. Pt states that she slipped and fell coming down stairs, denies striking her head, no LOC.  Pt has right leg pain radiating from knee down her leg

## 2021-07-02 NOTE — ED PROVIDER NOTES
EMERGENCY DEPARTMENT HISTORY AND PHYSICAL EXAM      Date: 7/2/2021  Patient Name: Adam Real    History of Presenting Illness     Chief Complaint   Patient presents with    Leg Pain     pt presents ambulatory to triage with cane in place, stating that she fell down stairs. Pt states that she slipped and fell coming down stairs, denies striking her head, no LOC. Pt has right leg pain radiating from knee down her leg       History Provided By: Patient    HPI: Adam Real, 64 y.o. female with a past medical history significant diabetes, hypertension and hyperlipidemia presents to the ED with cc of right knee pain status post fall down 3 steps at home, patient states that she ended up with the right leg flexed under her right thigh, rates pain as 10/10 worse with movement    There are no other complaints, changes, or physical findings at this time. PCP: Thomas Lynch MD    No current facility-administered medications on file prior to encounter. Current Outpatient Medications on File Prior to Encounter   Medication Sig Dispense Refill    azithromycin (Zithromax Z-Alexandru) 250 mg tablet Dental infection  Indications: a bacterial infection of the middle ear 5 Tablet 0    docusate sodium (Colace) 100 mg capsule Take 100 mg by mouth nightly.  ergocalciferol (Vitamin D2) 1,250 mcg (50,000 unit) capsule Take 50,000 Units by mouth every seven (7) days. Takes on Thursdays      aspirin 81 mg chewable tablet Take 81 mg by mouth daily.  fluticasone propionate (FLONASE) 50 mcg/actuation nasal spray 2 Sprays by Both Nostrils route daily. Indications: chronic stuffy and runny nose not caused by allergies      loratadine (Claritin) 10 mg tablet Take 10 mg by mouth.  bisacodyL (DULCOLAX) 5 mg EC tablet Take 5 mg by mouth daily as needed.  fluticasone propionate (FLOVENT DISKUS) 50 mcg/actuation inhaler Take 2 Puffs by inhalation daily.       omeprazole (PRILOSEC) 20 mg capsule Take 20 mg by mouth daily as needed.  acarbose (PRECOSE) 100 mg tablet Take 100 mg by mouth three (3) times daily (with meals).  losartan (COZAAR) 100 mg tablet Take 100 mg by mouth daily.  ezetimibe (ZETIA) 10 mg tablet Take 10 mg by mouth daily.  atorvastatin (LIPITOR) 80 mg tablet Take 80 mg by mouth daily.  glipiZIDE SR (GLUCOTROL XL) 2.5 mg CR tablet Take 2.5 mg by mouth daily. Past History     Past Medical History:  Past Medical History:   Diagnosis Date    Allergies     Diabetes ([address]     Hypercholesteremia     Hypertension        Past Surgical History:  Past Surgical History:   Procedure Laterality Date    HX BACK SURGERY      HX  SECTION         Family History:  History reviewed. No pertinent family history. Social History:  Social History     Tobacco Use    Smoking status: Never Smoker    Smokeless tobacco: Never Used   Substance Use Topics    Alcohol use: Not Currently    Drug use: Never       Allergies: Allergies   Allergen Reactions    Nsaids (Non-Steroidal Anti-Inflammatory Drug) Unable to Obtain    Amoxicillin Hives         Review of Systems     Review of Systems   Constitutional: Negative for chills and fever. HENT: Negative for rhinorrhea and sore throat. Eyes: Negative for pain and visual disturbance. Respiratory: Negative for cough and shortness of breath. Cardiovascular: Negative for chest pain and leg swelling. Gastrointestinal: Negative for vomiting. Endocrine: Negative for polydipsia and polyuria. Genitourinary: Negative for dysuria and urgency. Musculoskeletal: Positive for arthralgias. Negative for back pain and myalgias. Skin: Negative for color change and pallor. Neurological: Negative for weakness and numbness. Psychiatric/Behavioral: Negative. Physical Exam     Physical Exam  Vitals and nursing note reviewed. Constitutional:       Appearance: Normal appearance. HENT:      Head: Normocephalic and atraumatic.       Mouth/Throat: Mouth: Mucous membranes are moist.      Pharynx: Oropharynx is clear. Eyes:      Extraocular Movements: Extraocular movements intact. Conjunctiva/sclera: Conjunctivae normal.      Pupils: Pupils are equal, round, and reactive to light. Cardiovascular:      Rate and Rhythm: Normal rate and regular rhythm. Pulses: Normal pulses. Heart sounds: Normal heart sounds. Pulmonary:      Effort: Pulmonary effort is normal.      Breath sounds: Normal breath sounds. Abdominal:      General: Bowel sounds are normal.      Palpations: Abdomen is soft. Musculoskeletal:         General: Swelling, tenderness and signs of injury present. No deformity. Cervical back: Normal, normal range of motion and neck supple. Thoracic back: Normal.      Lumbar back: Normal.      Right knee: Swelling present. No deformity, bony tenderness or crepitus. Tenderness present over the medial joint line. Normal alignment. Left knee: Normal.   Skin:     General: Skin is warm and dry. Capillary Refill: Capillary refill takes less than 2 seconds. Neurological:      General: No focal deficit present. Mental Status: She is alert and oriented to person, place, and time. Psychiatric:         Mood and Affect: Mood normal.         Behavior: Behavior normal.         Lab and Diagnostic Study Results     Labs -   No results found for this or any previous visit (from the past 12 hour(s)). Radiologic Studies -   @lastxrresult@  CT Results  (Last 48 hours)    None        CXR Results  (Last 48 hours)    None            Medical Decision Making   - I am the first provider for this patient. - I reviewed the vital signs, available nursing notes, past medical history, past surgical history, family history and social history. - Initial assessment performed. The patients presenting problems have been discussed, and they are in agreement with the care plan formulated and outlined with them.   I have encouraged them to ask questions as they arise throughout their visit. Vital Signs-Reviewed the patient's vital signs. Patient Vitals for the past 12 hrs:   Temp Pulse Resp BP SpO2   07/02/21 1120 97.8 °F (36.6 °C) (!) 59 20 (!) 142/73 99 %       Records Reviewed: Nursing Notes    The patient presents with knee pain with a differential diagnosis of effusion, closed fracture, ligamentous strain      ED Course:          Provider Notes (Medical Decision Making): MDM       Procedures   Medical Decision Makingedical Decision Making  Performed by: Sonia Lynch MD  PROCEDURES:  Procedures       Disposition   Disposition: Condition stable and improved  DC- Adult Discharges: All of the diagnostic tests were reviewed and questions answered. Diagnosis, care plan and treatment options were discussed. The patient understands the instructions and will follow up as directed. The patients results have been reviewed with them. They have been counseled regarding their diagnosis. The patient verbally convey understanding and agreement of the signs, symptoms, diagnosis, treatment and prognosis and additionally agrees to follow up as recommended with their PCP in 24 - 48 hours. They also agree with the care-plan and convey that all of their questions have been answered. I have also put together some discharge instructions for them that include: 1) educational information regarding their diagnosis, 2) how to care for their diagnosis at home, as well a 3) list of reasons why they would want to return to the ED prior to their follow-up appointment, should their condition change. DISCHARGE PLAN:  1. Current Discharge Medication List      CONTINUE these medications which have NOT CHANGED    Details   azithromycin (Zithromax Z-Alexandru) 250 mg tablet Dental infection  Indications: a bacterial infection of the middle ear  Qty: 5 Tablet, Refills: 0      docusate sodium (Colace) 100 mg capsule Take 100 mg by mouth nightly. ergocalciferol (Vitamin D2) 1,250 mcg (50,000 unit) capsule Take 50,000 Units by mouth every seven (7) days. Takes on Thursdays      aspirin 81 mg chewable tablet Take 81 mg by mouth daily. fluticasone propionate (FLONASE) 50 mcg/actuation nasal spray 2 Sprays by Both Nostrils route daily. Indications: chronic stuffy and runny nose not caused by allergies      loratadine (Claritin) 10 mg tablet Take 10 mg by mouth.      bisacodyL (DULCOLAX) 5 mg EC tablet Take 5 mg by mouth daily as needed. fluticasone propionate (FLOVENT DISKUS) 50 mcg/actuation inhaler Take 2 Puffs by inhalation daily. omeprazole (PRILOSEC) 20 mg capsule Take 20 mg by mouth daily as needed. acarbose (PRECOSE) 100 mg tablet Take 100 mg by mouth three (3) times daily (with meals). losartan (COZAAR) 100 mg tablet Take 100 mg by mouth daily. ezetimibe (ZETIA) 10 mg tablet Take 10 mg by mouth daily. atorvastatin (LIPITOR) 80 mg tablet Take 80 mg by mouth daily. glipiZIDE SR (GLUCOTROL XL) 2.5 mg CR tablet Take 2.5 mg by mouth daily. 2.   Follow-up Information    None       3. Return to ED if worse   4. Current Discharge Medication List            Diagnosis     Clinical Impression: No diagnosis found. Attestations:    Alexandrea Case MD    Please note that this dictation was completed with [email or web address], the computer voice recognition software. Quite often unanticipated grammatical, syntax, homophones, and other interpretive errors are inadvertently transcribed by the computer software. Please disregard these errors. Please excuse any errors that have escaped final proofreading. Thank you.

## 2021-08-13 ENCOUNTER — HOSPITAL ENCOUNTER (EMERGENCY)
Age: 61
Discharge: HOME | End: 2021-08-13
Attending: EMERGENCY MEDICINE
Payer: MEDICARE

## 2021-08-13 VITALS
OXYGEN SATURATION: 99 % | TEMPERATURE: 97.5 F | SYSTOLIC BLOOD PRESSURE: 170 MMHG | HEIGHT: 66 IN | HEART RATE: 81 BPM | BODY MASS INDEX: 25.71 KG/M2 | RESPIRATION RATE: 18 BRPM | WEIGHT: 160 LBS | DIASTOLIC BLOOD PRESSURE: 96 MMHG

## 2021-08-13 DIAGNOSIS — K04.7 DENTAL ABSCESS: Primary | ICD-10-CM

## 2021-08-13 PROCEDURE — 99282 EMERGENCY DEPT VISIT SF MDM: CPT

## 2021-08-13 RX ORDER — CLINDAMYCIN HYDROCHLORIDE 300 MG/1
300 CAPSULE ORAL 4 TIMES DAILY
Qty: 28 CAPSULE | Refills: 0 | Status: SHIPPED | OUTPATIENT
Start: 2021-08-13 | End: 2021-08-20

## 2021-08-13 NOTE — ED TRIAGE NOTES
Pt reports her tooth hurts on her right side of mouth--states \"II want to make sure I do not have infection.

## 2021-08-13 NOTE — ED NOTES
Dr. Mandy Portillo reviewed discharge instructions with the patient. The patient verbalized understanding. All questions and concerns were addressed. The patient declined a wheelchair and is discharged ambulatory in the care of family members with instructions and prescriptions in hand. Pt is alert and oriented x 4. Respirations are clear and unlabored.

## 2021-08-13 NOTE — ED PROVIDER NOTES
EMERGENCY DEPARTMENT HISTORY AND PHYSICAL EXAM      Date: 8/13/2021  Patient Name: Suzi Carmona    History of Presenting Illness     Chief Complaint   Patient presents with    Dental Pain       History Provided By: Patient    HPI: Suzi Carmona, 64 y.o. female with a past medical history significant diabetes, hypertension and GERD presents to the ED with cc of dental pain with swelling. since this morning,  Patient was seen by the dentist and placed on antibiotic. 3 weeks ago. She was told she has periodontal disease and need deep scaling. No temp or chills. Patient would an anitbiotic. There are no other complaints, changes, or physical findings at this time. PCP: Margie Junior MD    No current facility-administered medications on file prior to encounter. Current Outpatient Medications on File Prior to Encounter   Medication Sig Dispense Refill    azithromycin (Zithromax Z-Alexandru) 250 mg tablet Dental infection  Indications: a bacterial infection of the middle ear 5 Tablet 0    docusate sodium (Colace) 100 mg capsule Take 100 mg by mouth nightly.  ergocalciferol (Vitamin D2) 1,250 mcg (50,000 unit) capsule Take 50,000 Units by mouth every seven (7) days. Takes on Thursdays      aspirin 81 mg chewable tablet Take 81 mg by mouth daily.  fluticasone propionate (FLONASE) 50 mcg/actuation nasal spray 2 Sprays by Both Nostrils route daily. Indications: chronic stuffy and runny nose not caused by allergies      loratadine (Claritin) 10 mg tablet Take 10 mg by mouth.  bisacodyL (DULCOLAX) 5 mg EC tablet Take 5 mg by mouth daily as needed.  fluticasone propionate (FLOVENT DISKUS) 50 mcg/actuation inhaler Take 2 Puffs by inhalation daily.  omeprazole (PRILOSEC) 20 mg capsule Take 20 mg by mouth daily as needed.  acarbose (PRECOSE) 100 mg tablet Take 100 mg by mouth three (3) times daily (with meals).  losartan (COZAAR) 100 mg tablet Take 100 mg by mouth daily.       ezetimibe (ZETIA) 10 mg tablet Take 10 mg by mouth daily.  atorvastatin (LIPITOR) 80 mg tablet Take 80 mg by mouth daily.  glipiZIDE SR (GLUCOTROL XL) 2.5 mg CR tablet Take 2.5 mg by mouth daily. Past History     Past Medical History:  Past Medical History:   Diagnosis Date    Allergies     Diabetes ([address]     Hypercholesteremia     Hypertension        Past Surgical History:  Past Surgical History:   Procedure Laterality Date    HX BACK SURGERY      HX  SECTION         Family History:  No family history on file. Social History:  Social History     Tobacco Use    Smoking status: Never Smoker    Smokeless tobacco: Never Used   Substance Use Topics    Alcohol use: Not Currently    Drug use: Never       Allergies: Allergies   Allergen Reactions    Nsaids (Non-Steroidal Anti-Inflammatory Drug) Unable to Obtain    Amoxicillin Hives         Review of Systems     Review of Systems   Constitutional: Negative. Negative for fever. HENT: Positive for dental problem. Peridontal and tooth  pain   Eyes: Negative. Respiratory: Negative. Cardiovascular: Negative. Gastrointestinal: Negative. Endocrine: Negative. Genitourinary: Negative. Musculoskeletal: Negative. Skin: Negative. Allergic/Immunologic: Negative. Neurological: Negative. Hematological: Negative. Psychiatric/Behavioral: Negative. Physical Exam   Physical Exam  Vitals and nursing note reviewed. Constitutional:       Appearance: Normal appearance. HENT:      Head: Normocephalic. Right Ear: Tympanic membrane normal.      Left Ear: Tympanic membrane normal.      Nose: Nose normal.      Mouth/Throat:      Mouth: Mucous membranes are moist.        Comments: Dental tenderness (plaque formation around tooth #22  Eyes:      Pupils: Pupils are equal, round, and reactive to light. Cardiovascular:      Rate and Rhythm: Normal rate. Pulses: Normal pulses.    Pulmonary:      Effort: Pulmonary effort is normal.   Abdominal:      General: Abdomen is flat. Palpations: Abdomen is soft. Musculoskeletal:         General: Normal range of motion. Cervical back: Normal range of motion and neck supple. Skin:     General: Skin is warm. Capillary Refill: Capillary refill takes less than 2 seconds. Neurological:      General: No focal deficit present. Mental Status: She is alert. Psychiatric:         Mood and Affect: Mood normal.         Lab and Diagnostic Study Results     Labs -   No results found for this or any previous visit (from the past 12 hour(s)). Radiologic Studies -   @lastxrresult@  CT Results  (Last 48 hours)    None        CXR Results  (Last 48 hours)    None            Medical Decision Making   - I am the first provider for this patient. - I reviewed the vital signs, available nursing notes, past medical history, past surgical history, family history and social history. - Initial assessment performed. The patients presenting problems have been discussed, and they are in agreement with the care plan formulated and outlined with them. I have encouraged them to ask questions as they arise throughout their visit. Vital Signs-Reviewed the patient's vital signs. Patient Vitals for the past 12 hrs:   Temp Pulse Resp BP SpO2   08/13/21 1401 97.5 °F (36.4 °C) 81 18 (!) 170/96 99 %       Records Reviewed: Nursing Notes    The patient presents with dental pain  with a differential diagnosis of dental cavies, dental plaque formation, dental abscess    ED Course:          Provider Notes (Medical Decision Making): MDM       Procedures   Medical Decision Makingedical Decision Making  Performed by: Obey Arambula MD  PROCEDURES:Procedures       Disposition   Disposition: Condition stable  DC- Adult Discharges: All of the diagnostic tests were reviewed and questions answered. Diagnosis, care plan and treatment options were discussed.   The patient understands the instructions and will follow up as directed. The patients results have been reviewed with them. They have been counseled regarding their diagnosis. The patient verbally convey understanding and agreement of the signs, symptoms, diagnosis, treatment and prognosis and additionally agrees to follow up as recommended with their PCP in 24 - 48 hours. They also agree with the care-plan and convey that all of their questions have been answered. I have also put together some discharge instructions for them that include: 1) educational information regarding their diagnosis, 2) how to care for their diagnosis at home, as well a 3) list of reasons why they would want to return to the ED prior to their follow-up appointment, should their condition change. DISCHARGE PLAN:  1. Current Discharge Medication List      CONTINUE these medications which have NOT CHANGED    Details   azithromycin (Zithromax Z-Alexandru) 250 mg tablet Dental infection  Indications: a bacterial infection of the middle ear  Qty: 5 Tablet, Refills: 0      docusate sodium (Colace) 100 mg capsule Take 100 mg by mouth nightly.      ergocalciferol (Vitamin D2) 1,250 mcg (50,000 unit) capsule Take 50,000 Units by mouth every seven (7) days. Takes on Thursdays      aspirin 81 mg chewable tablet Take 81 mg by mouth daily. fluticasone propionate (FLONASE) 50 mcg/actuation nasal spray 2 Sprays by Both Nostrils route daily. Indications: chronic stuffy and runny nose not caused by allergies      loratadine (Claritin) 10 mg tablet Take 10 mg by mouth.      bisacodyL (DULCOLAX) 5 mg EC tablet Take 5 mg by mouth daily as needed. fluticasone propionate (FLOVENT DISKUS) 50 mcg/actuation inhaler Take 2 Puffs by inhalation daily. omeprazole (PRILOSEC) 20 mg capsule Take 20 mg by mouth daily as needed. acarbose (PRECOSE) 100 mg tablet Take 100 mg by mouth three (3) times daily (with meals).       losartan (COZAAR) 100 mg tablet Take 100 mg by mouth daily. ezetimibe (ZETIA) 10 mg tablet Take 10 mg by mouth daily. atorvastatin (LIPITOR) 80 mg tablet Take 80 mg by mouth daily. glipiZIDE SR (GLUCOTROL XL) 2.5 mg CR tablet Take 2.5 mg by mouth daily. 2.   Follow-up Information    None       3. Return to ED if worse   4. Current Discharge Medication List            Diagnosis     Clinical Impression: . ICD-10-CM ICD-9-CM    1. Dental abscess  K04.7 522.5          Lola Lopez MD    Please note that this dictation was completed with [employer], the computer voice recognition software. Quite often unanticipated grammatical, syntax, homophones, and other interpretive errors are inadvertently transcribed by the computer software. Please disregard these errors. Please excuse any errors that have escaped final proofreading. Thank you.

## 2021-11-05 ENCOUNTER — HOSPITAL ENCOUNTER (EMERGENCY)
Age: 61
Discharge: HOME | End: 2021-11-05
Attending: EMERGENCY MEDICINE
Payer: MEDICARE

## 2021-11-05 VITALS
SYSTOLIC BLOOD PRESSURE: 164 MMHG | RESPIRATION RATE: 18 BRPM | OXYGEN SATURATION: 98 % | DIASTOLIC BLOOD PRESSURE: 87 MMHG | HEART RATE: 91 BPM | TEMPERATURE: 98.7 F | WEIGHT: 150 LBS | BODY MASS INDEX: 24.99 KG/M2 | HEIGHT: 65 IN

## 2021-11-05 DIAGNOSIS — I10 BENIGN ESSENTIAL HTN: Primary | ICD-10-CM

## 2021-11-05 PROCEDURE — 99282 EMERGENCY DEPT VISIT SF MDM: CPT

## 2021-11-05 NOTE — ED TRIAGE NOTES
Pt reports high BP today--- BP at home 170/99--in triage 164/87--    Pt also reports \"buzzing in ear\"--whenever she lays down to go to sleep. Pt denies CP. Sob, N/V/D.

## 2021-11-05 NOTE — ED PROVIDER NOTES
EMERGENCY DEPARTMENT HISTORY AND PHYSICAL EXAM      Date: 11/5/2021  Patient Name: Angelique Evans    History of Presenting Illness     Chief Complaint   Patient presents with    Hypertension    Ringing in Ear       History Provided By: Patient    HPI: Angelique Evans, 64 y.o. female with a past medical history significant diabetes and hypertension presents to the ED with cc of elevated bld pressure and bussing in her ear for one day. She denies nausea, dizziness , or sob, chest pain. No fever or chills. Patient has an appt with ENT doctor next week. There are no other complaints, changes, or physical findings at this time. PCP: Polina Giordano MD    No current facility-administered medications on file prior to encounter. Current Outpatient Medications on File Prior to Encounter   Medication Sig Dispense Refill    azithromycin (Zithromax Z-Alexandru) 250 mg tablet Dental infection  Indications: a bacterial infection of the middle ear 5 Tablet 0    docusate sodium (Colace) 100 mg capsule Take 100 mg by mouth nightly.  ergocalciferol (Vitamin D2) 1,250 mcg (50,000 unit) capsule Take 50,000 Units by mouth every seven (7) days. Takes on Thursdays      aspirin 81 mg chewable tablet Take 81 mg by mouth daily.  fluticasone propionate (FLONASE) 50 mcg/actuation nasal spray 2 Sprays by Both Nostrils route daily. Indications: chronic stuffy and runny nose not caused by allergies      loratadine (Claritin) 10 mg tablet Take 10 mg by mouth.  bisacodyL (DULCOLAX) 5 mg EC tablet Take 5 mg by mouth daily as needed.  fluticasone propionate (FLOVENT DISKUS) 50 mcg/actuation inhaler Take 2 Puffs by inhalation daily.  omeprazole (PRILOSEC) 20 mg capsule Take 20 mg by mouth daily as needed.  acarbose (PRECOSE) 100 mg tablet Take 100 mg by mouth three (3) times daily (with meals).  losartan (COZAAR) 100 mg tablet Take 100 mg by mouth daily.       ezetimibe (ZETIA) 10 mg tablet Take 10 mg by mouth daily.      atorvastatin (LIPITOR) 80 mg tablet Take 80 mg by mouth daily.  glipiZIDE SR (GLUCOTROL XL) 2.5 mg CR tablet Take 2.5 mg by mouth daily. Past History     Past Medical History:  Past Medical History:   Diagnosis Date    Allergies     Diabetes ([address]     Hypercholesteremia     Hypertension        Past Surgical History:  Past Surgical History:   Procedure Laterality Date    HX BACK SURGERY      HX  SECTION         Family History:  No family history on file. Social History:  Social History     Tobacco Use    Smoking status: Never Smoker    Smokeless tobacco: Never Used   Substance Use Topics    Alcohol use: Not Currently    Drug use: Never       Allergies: Allergies   Allergen Reactions    Nsaids (Non-Steroidal Anti-Inflammatory Drug) Unable to Obtain    Amoxicillin Hives           Review of Systems   Constitutional: Negative. HENT: Positive for ear pain. Busing sound in ears   Eyes: Negative. Respiratory: Negative. Cardiovascular: Negative. Gastrointestinal: Negative. Endocrine: Negative. Genitourinary: Negative. Musculoskeletal: Negative. Skin: Negative. Allergic/Immunologic: Negative. Neurological: Negative. Negative for dizziness. Hematological: Negative. Psychiatric/Behavioral: Negative. Physical Exam     Physical Exam  Vitals and nursing note reviewed. Constitutional:       Appearance: Normal appearance. HENT:      Head: Normocephalic. Right Ear: Tympanic membrane normal.      Left Ear: Tympanic membrane normal.      Nose: Nose normal.      Mouth/Throat:      Mouth: Mucous membranes are moist.   Eyes:      Pupils: Pupils are equal, round, and reactive to light. Cardiovascular:      Rate and Rhythm: Normal rate. Pulses: Normal pulses. Pulmonary:      Effort: Pulmonary effort is normal.   Abdominal:      General: Abdomen is flat. Palpations: Abdomen is soft.    Musculoskeletal:         General: Normal range of motion. Cervical back: Normal range of motion and neck supple. Skin:     Capillary Refill: Capillary refill takes less than 2 seconds. Neurological:      General: No focal deficit present. Mental Status: She is alert. Psychiatric:         Mood and Affect: Mood normal.         Lab and Diagnostic Study Results     Labs -   No results found for this or any previous visit (from the past 12 hour(s)). Radiologic Studies -   @lastxrresult@  CT Results  (Last 48 hours)    None        CXR Results  (Last 48 hours)    None            Medical Decision Making   - I am the first provider for this patient. - I reviewed the vital signs, available nursing notes, past medical history, past surgical history, family history and social history. - Initial assessment performed. The patients presenting problems have been discussed, and they are in agreement with the care plan formulated and outlined with them. I have encouraged them to ask questions as they arise throughout their visit. Vital Signs-Reviewed the patient's vital signs. Patient Vitals for the past 12 hrs:   Temp Pulse Resp BP SpO2   11/05/21 1337 98.7 °F (37.1 °C) 91 18 (!) 164/87 98 %       Records Reviewed: Nursing Notes    The patient presents with  Elevated blood pressure with a differential diagnosis of essential HTN    ED Course:          Provider Notes (Medical Decision Making): MDM       Procedures   Medical Decision Makingedical Decision Making  Performed by: Danna Amin MD  PROCEDURES:Procedures       Disposition   Disposition: Condition stable  DC- Adult Discharges: All of the diagnostic tests were reviewed and questions answered. Diagnosis, care plan and treatment options were discussed. The patient understands the instructions and will follow up as directed. The patients results have been reviewed with them. They have been counseled regarding their diagnosis.   The patient verbally convey understanding and agreement of the signs, symptoms, diagnosis, treatment and prognosis and additionally agrees to follow up as recommended with their PCP in 24 - 48 hours. They also agree with the care-plan and convey that all of their questions have been answered. I have also put together some discharge instructions for them that include: 1) educational information regarding their diagnosis, 2) how to care for their diagnosis at home, as well a 3) list of reasons why they would want to return to the ED prior to their follow-up appointment, should their condition change. DISCHARGE PLAN:  1. Current Discharge Medication List      CONTINUE these medications which have NOT CHANGED    Details   azithromycin (Zithromax Z-Alexandru) 250 mg tablet Dental infection  Indications: a bacterial infection of the middle ear  Qty: 5 Tablet, Refills: 0      docusate sodium (Colace) 100 mg capsule Take 100 mg by mouth nightly.      ergocalciferol (Vitamin D2) 1,250 mcg (50,000 unit) capsule Take 50,000 Units by mouth every seven (7) days. Takes on Thursdays      aspirin 81 mg chewable tablet Take 81 mg by mouth daily. fluticasone propionate (FLONASE) 50 mcg/actuation nasal spray 2 Sprays by Both Nostrils route daily. Indications: chronic stuffy and runny nose not caused by allergies      loratadine (Claritin) 10 mg tablet Take 10 mg by mouth.      bisacodyL (DULCOLAX) 5 mg EC tablet Take 5 mg by mouth daily as needed. fluticasone propionate (FLOVENT DISKUS) 50 mcg/actuation inhaler Take 2 Puffs by inhalation daily. omeprazole (PRILOSEC) 20 mg capsule Take 20 mg by mouth daily as needed. acarbose (PRECOSE) 100 mg tablet Take 100 mg by mouth three (3) times daily (with meals). losartan (COZAAR) 100 mg tablet Take 100 mg by mouth daily. ezetimibe (ZETIA) 10 mg tablet Take 10 mg by mouth daily. atorvastatin (LIPITOR) 80 mg tablet Take 80 mg by mouth daily.       glipiZIDE SR (GLUCOTROL XL) 2.5 mg CR tablet Take 2.5 mg by mouth daily. 2.   Follow-up Information    None       3. Return to ED if worse   4. Current Discharge Medication List            Diagnosis     Clinical Impression:     ICD-10-CM ICD-9-CM    1. Benign essential HTN  I10 401.1        Colt Winchester MD    Please note that this dictation was completed with [employer], the computer voice recognition software. Quite often unanticipated grammatical, syntax, homophones, and other interpretive errors are inadvertently transcribed by the computer software. Please disregard these errors. Please excuse any errors that have escaped final proofreading. Thank you.

## 2021-11-26 ENCOUNTER — HOSPITAL ENCOUNTER (EMERGENCY)
Age: 61
Discharge: HOME | End: 2021-11-26
Attending: EMERGENCY MEDICINE
Payer: MEDICARE

## 2021-11-26 VITALS
OXYGEN SATURATION: 100 % | TEMPERATURE: 98 F | DIASTOLIC BLOOD PRESSURE: 77 MMHG | HEART RATE: 59 BPM | RESPIRATION RATE: 16 BRPM | SYSTOLIC BLOOD PRESSURE: 153 MMHG

## 2021-11-26 DIAGNOSIS — I10 PRIMARY HYPERTENSION: Primary | ICD-10-CM

## 2021-11-26 PROCEDURE — 99282 EMERGENCY DEPT VISIT SF MDM: CPT

## 2021-11-26 PROCEDURE — 74011250637 HC RX REV CODE- 250/637: Performed by: EMERGENCY MEDICINE

## 2021-11-26 RX ORDER — AMLODIPINE BESYLATE 5 MG/1
5 TABLET ORAL DAILY
COMMUNITY

## 2021-11-26 RX ORDER — CYCLOSPORINE 0.5 MG/ML
1 EMULSION OPHTHALMIC EVERY 12 HOURS
COMMUNITY

## 2021-11-26 RX ORDER — PREDNISOLONE ACETATE 10 MG/ML
1 SUSPENSION/ DROPS OPHTHALMIC 3 TIMES DAILY
COMMUNITY

## 2021-11-26 RX ORDER — CLONIDINE HYDROCHLORIDE 0.1 MG/1
0.1 TABLET ORAL
Status: COMPLETED | OUTPATIENT
Start: 2021-11-26 | End: 2021-11-26

## 2021-11-26 RX ADMIN — CLONIDINE HYDROCHLORIDE 0.1 MG: 0.1 TABLET ORAL at 22:35

## 2021-11-27 NOTE — ED PROVIDER NOTES
Patient presents with complaint of elevated blood pressure tonight . No headache , or dizziness. No other complaints. Past Medical History:   Diagnosis Date    Allergies     Diabetes ([address]     Hypercholesteremia     Hypertension        Past Surgical History:   Procedure Laterality Date    HX BACK SURGERY      HX  SECTION           History reviewed. No pertinent family history. Social History     Socioeconomic History    Marital status: SINGLE     Spouse name: Not on file    Number of children: Not on file    Years of education: Not on file    Highest education level: Not on file   Occupational History    Not on file   Tobacco Use    Smoking status: Never Smoker    Smokeless tobacco: Never Used   Substance and Sexual Activity    Alcohol use: Not Currently    Drug use: Never    Sexual activity: Not on file   Other Topics Concern    Not on file   Social History Narrative    Not on file     Social Determinants of Health     Financial Resource Strain:     Difficulty of Paying Living Expenses: Not on file   Food Insecurity:     Worried About Running Out of Food in the Last Year: Not on file    Mendy of Food in the Last Year: Not on file   Transportation Needs:     Lack of Transportation (Medical): Not on file    Lack of Transportation (Non-Medical):  Not on file   Physical Activity:     Days of Exercise per Week: Not on file    Minutes of Exercise per Session: Not on file   Stress:     Feeling of Stress : Not on file   Social Connections:     Frequency of Communication with Friends and Family: Not on file    Frequency of Social Gatherings with Friends and Family: Not on file    Attends Voodoo Services: Not on file    Active Member of Clubs or Organizations: Not on file    Attends Club or Organization Meetings: Not on file    Marital Status: Not on file   Intimate Partner Violence:     Fear of Current or Ex-Partner: Not on file    Emotionally Abused: Not on file  Physically Abused: Not on file    Sexually Abused: Not on file   Housing Stability:     Unable to Pay for Housing in the Last Year: Not on file    Number of Places Lived in the Last Year: Not on file    Unstable Housing in the Last Year: Not on file         ALLERGIES: Nsaids (non-steroidal anti-inflammatory drug) and Amoxicillin    Review of Systems   Constitutional: Negative. HENT: Negative. Eyes: Negative. Respiratory: Negative. Cardiovascular: Negative. Gastrointestinal: Negative. Endocrine: Negative. Genitourinary: Negative. Skin: Negative. Allergic/Immunologic: Negative. Neurological: Negative. Hematological: Negative. Psychiatric/Behavioral: Negative. All other systems reviewed and are negative. Vitals:    11/26/21 2156   BP: (!) 175/87   Pulse: 71   Resp: 16   Temp: 98 °F (36.7 °C)   SpO2: 99%            Physical Exam  Vitals and nursing note reviewed. Constitutional:       Appearance: She is well-developed. HENT:      Head: Normocephalic and atraumatic. Cardiovascular:      Rate and Rhythm: Normal rate and regular rhythm. Heart sounds: Normal heart sounds. Pulmonary:      Breath sounds: Normal breath sounds. Abdominal:      General: Bowel sounds are normal.      Palpations: Abdomen is soft. Musculoskeletal:         General: Normal range of motion. Cervical back: Normal range of motion and neck supple. Neurological:      General: No focal deficit present. Mental Status: She is alert. Mental status is at baseline.    Psychiatric:         Mood and Affect: Mood normal.         Behavior: Behavior normal.          MDM       Procedures

## 2021-11-27 NOTE — ED NOTES
Pt provided with d/c instructions and paperwork. All questions answered. Pt in NAD, ambulatory out of the department.

## 2021-11-27 NOTE — ED TRIAGE NOTES
Pt c/o hypertension.  Pt reports blood pressure was high (193/92) on home machine, wanted to recheck BP at hospital.

## 2021-12-02 ENCOUNTER — TELEPHONE (OUTPATIENT)
Dept: ENT CLINIC | Age: 61
End: 2021-12-02

## 2021-12-02 NOTE — TELEPHONE ENCOUNTER
Per our manager Ree. Insurance called 3 way with pt to verify appt time and date for transportation. Pt verify name, date of birth, address and phone number. Pt stated it was okay to inform insurance of appt for transportation.

## 2021-12-07 ENCOUNTER — OFFICE VISIT (OUTPATIENT)
Dept: ENT CLINIC | Age: 61
End: 2021-12-07
Payer: MEDICARE

## 2021-12-07 VITALS
BODY MASS INDEX: 25.33 KG/M2 | HEIGHT: 65 IN | DIASTOLIC BLOOD PRESSURE: 78 MMHG | RESPIRATION RATE: 20 BRPM | WEIGHT: 152 LBS | SYSTOLIC BLOOD PRESSURE: 121 MMHG | HEART RATE: 86 BPM | OXYGEN SATURATION: 100 %

## 2021-12-07 DIAGNOSIS — R09.82 PND (POST-NASAL DRIP): Primary | ICD-10-CM

## 2021-12-07 DIAGNOSIS — H93.13 TINNITUS OF BOTH EARS: ICD-10-CM

## 2021-12-07 DIAGNOSIS — R09.81 NASAL CONGESTION: ICD-10-CM

## 2021-12-07 PROCEDURE — 99203 OFFICE O/P NEW LOW 30 MIN: CPT | Performed by: NURSE PRACTITIONER

## 2021-12-07 RX ORDER — DOCUSATE SODIUM 100 MG/1
100 CAPSULE, LIQUID FILLED ORAL 2 TIMES DAILY
COMMUNITY

## 2021-12-07 RX ORDER — AZELASTINE 1 MG/ML
SPRAY, METERED NASAL
Qty: 1 EACH | Refills: 0 | Status: SHIPPED | OUTPATIENT
Start: 2021-12-07 | End: 2022-01-12 | Stop reason: SDUPTHER

## 2021-12-07 NOTE — PROGRESS NOTES
Chief Complaint   Patient presents with    New Patient    Allergic Rhinitis    Nasal Congestion       Visit Vitals  /78   Pulse 86   Resp 20   Ht 5' 5\" (1.651 m)   Wt 152 lb (68.9 kg)   SpO2 100%   BMI 25.29 kg/m²       1. Have you been to the ER, urgent care clinic since your last visit? Hospitalized since your last visit? [facility] due High Blood pressure on Nov.26, 2021.     2. Have you seen or consulted any other health care providers outside of the [address] [place] [address] since your last visit? Include any pap smears or colon screening.  No

## 2021-12-07 NOTE — PROGRESS NOTES
Otolaryngology-Head and Neck Surgery  New Patient Visit     Patient: Mary Nolan  YOB: 1960  MRN: [id number]  Date of Service: 2021    Chief Complaint: Allergy symptoms, tinnitus    History of Present Illness: Mary Nolan is a 64y.o. year old female who presents today for discussion of    Reports \"allergy symptoms\" for past 6 months  +PND, nasal congestion, fluid in ears, tinnitus (buzz)  Denies hearing changes, dizziness, balance issues  Takes Claritin, started last week; Flonase daily for 1 month.   No ENT surgical Hx  No pertinent family Hx    Past Medical History:  Past Medical History:   Diagnosis Date    Allergies     Diabetes ([address]     Hypercholesteremia     Hypertension        Past Surgical History:   Past Surgical History:   Procedure Laterality Date    HX BACK SURGERY      HX  SECTION         Medications:   Current Outpatient Medications   Medication Instructions    acarbose (PRECOSE) 100 mg, Oral, 3 TIMES DAILY WITH MEALS    amLODIPine (NORVASC) 5 mg, Oral, DAILY    aspirin 81 mg, Oral, DAILY    atorvastatin (LIPITOR) 80 mg, Oral, DAILY    azithromycin (Zithromax Z-Alexandru) 250 mg tablet Dental infection    bisacodyL (DULCOLAX) 5 mg, DAILY PRN    cycloSPORINE (Restasis) 0.05 % dpet 1 Drop, Both Eyes, EVERY 12 HOURS    docusate sodium (COLACE) 100 mg, Oral, EVERY BEDTIME    docusate sodium (COLACE) 100 mg, Oral, 2 TIMES DAILY    ergocalciferol (VITAMIN D2) 50,000 Units, Oral, EVERY 7 DAYS, Takes on      ezetimibe (ZETIA) 10 mg, Oral, DAILY    fluticasone propionate (FLONASE) 50 mcg/actuation nasal spray 2 Sprays, Both Nostrils, DAILY    fluticasone propionate (FLOVENT DISKUS) 50 mcg/actuation inhaler 2 Puffs, DAILY    glipiZIDE SR (GLUCOTROL XL) 2.5 mg, DAILY    loratadine (CLARITIN) 10 mg, Oral    losartan (COZAAR) 100 mg, Oral, DAILY    omeprazole (PRILOSEC) 20 mg, Oral, DAILY AS NEEDED    prednisoLONE acetate (PRED FORTE) 1 % ophthalmic suspension 1 Drop, Both Eyes, 3 TIMES DAILY       Allergies: Allergies   Allergen Reactions    Nsaids (Non-Steroidal Anti-Inflammatory Drug) Unable to Obtain    Amoxicillin Hives       Social History:   Social History     Tobacco Use    Smoking status: Never Smoker    Smokeless tobacco: Never Used   Substance Use Topics    Alcohol use: Not Currently    Drug use: Never        Family History:  History reviewed. No pertinent family history. Review of Systems:    Consitutional: denies fever, excessive weight gain or loss. Eyes: denies diplopia, eye pain. Integumentary: denies new concerning skin lesions. Ears, Nose, Mouth, Throat: denies except as per HPI. Endocrine: denies hot or cold intolerance, increased thirst.  Respiratory: denies cough, hemoptysis, wheezing  Gastrointestinal: denies trouble swallowing, nausea, emesis, regurgitation  Musculoskeletal: denies muscle weakness or wasting  Cardiovascular: denies chest pain, shortness of breath  Neurologic: denies seizures, numbness or tingling, syncope  Hematologic: denies easy bleeding or bruising    Physical Examination:   Vitals:    12/07/21 1302   BP: 121/78   Pulse: 86   Resp: 20   Height: 5' 5\" (1.651 m)   Weight: 152 lb (68.9 kg)   SpO2: 100%        General: Comfortable, pleasant, appears stated age  Voice: Strong, speaking in full sentences, no stridor    Face: No masses or lesions, facial strength symmetric   Ears: External ears unremarkable. Bilateral ear canal clear. Tympanic membrane clear and intact, with visible landmarks. Clear middle ear space  Nose: External nose unremarkable. Dorsum midline. Anterior rhinoscopy demonstrates no lesions. Septum midline. Turbinates pale with minimal hypertrophy. Oral Cavity / Oropharynx: No trismus. Mucosa pink and moist. No lesions. Tongue is midline and mobile. Palate elevates symmetrically. Uvula midline. Tonsils unremarkable. Base of tongue soft. Floor of mouth soft. Neck: Supple. No adenopathy.  Thyroid unremarkable. Palpable laryngeal landmarks. Full neck range of motion   Neurologic: CN II - XI intact. Normal gait      Assessment and Plan:   1. PND   2. Tinnitus bilateral ears  3. Nasal congestion    -Continue Flonase daily. Discussed proper use. -Add Astelin daily.  -Continue Claritin daily.  -Discussed etiologies for tinnitus.  -If no improvement will consider allergy testing.   -Return in 4-6 weeks.      Memo Blocker MSN, FNP-C  [facility]  [address] [place] [address]  Office Phone: [phone]

## 2021-12-10 ENCOUNTER — TELEPHONE (OUTPATIENT)
Dept: ENT CLINIC | Age: 61
End: 2021-12-10

## 2021-12-10 RX ORDER — DOXYCYCLINE 100 MG/1
100 CAPSULE ORAL 2 TIMES DAILY
Qty: 20 CAPSULE | Refills: 0 | Status: SHIPPED | OUTPATIENT
Start: 2021-12-10 | End: 2021-12-20

## 2021-12-10 NOTE — TELEPHONE ENCOUNTER
Pt called stating that she would like a call back from [facility]. Pt stated that the NP prescribed her nasal spray and that she woke up this morning \"feeling like her head was going to fall off her shoulders\" and that \"she just felt funny\"   And would like a call back regarding this. Please Advise.

## 2021-12-10 NOTE — TELEPHONE ENCOUNTER
I called the patient and she was informed of medication at her pharmacy and directed how to take. Also reminded that the nasal spray will cause drowsiness.

## 2021-12-13 ENCOUNTER — TELEPHONE (OUTPATIENT)
Dept: ENT CLINIC | Age: 61
End: 2021-12-13

## 2021-12-13 NOTE — TELEPHONE ENCOUNTER
Pt called stating that she would like a call back from [facility], she stated this is regarding her allergies. Please Advise.

## 2021-12-14 NOTE — TELEPHONE ENCOUNTER
I called the patient and she stated that she felt bad yesterday, having a stuffy nose and full feeling in her sinuses. She said that she felt better today. Her questions were- could she take Tylenol with her prescribed medications and I informed her that she could as directed on the bottle. She also stated that she is taking her antibiotic and was wondering could she also take her diabetic and blood pressure medication at the same time. I informed her to always take her maintenance medication and to also take the antibiotic as directed until gone. Patient was in compliance.

## 2021-12-15 ENCOUNTER — TELEPHONE (OUTPATIENT)
Dept: ENT CLINIC | Age: 61
End: 2021-12-15

## 2021-12-15 NOTE — TELEPHONE ENCOUNTER
I called the patient back after viewing her allergy list which states allergy to Nsaids. The patient told me that she was only allergic to Ibuprofen, but she was able to take Tylenol products (Acetaminophen). She stated that her PCP has also suggested that she take Tylenol for discomfort.

## 2021-12-15 NOTE — TELEPHONE ENCOUNTER
I called the patient back and I informed her to use the nasal spray as directed and to finish out her antibiotic that was prescribed to her. She states that she is feeling bad in the morning due to not sleeping well. She said that last night she started again with the ringing noise in the ear which kept her awake. I told her to continue with the medications and she could take Tylenol for her headache. If symptoms of feeling bad in the mornings continue, that she should see her PCP to rule out a virus or any other medical concern. Patient was in compliance.

## 2021-12-15 NOTE — TELEPHONE ENCOUNTER
Pt called requesting phone call from [address] [place] [address]. Pt has appt scheduled 1/11 but pt states she is feeling worse even after following treatment as given at last appt. Pt is requesting an appt sooner than 1/11.  Please advise

## 2021-12-28 ENCOUNTER — TELEPHONE (OUTPATIENT)
Dept: ENT CLINIC | Age: 61
End: 2021-12-28

## 2021-12-28 NOTE — TELEPHONE ENCOUNTER
Patient called and stated that she was having a sore throat and nasal congestion. She was wanting a sooner appointment to be rechecked. She stated that she spoke to her PCP, who also stated to make a sooner appointment than her scheduled recheck appointment of 1/11/2022. I spoke with the Nurse Practitioner who stated that she needed to keep using the nasal spray and she needs to give this medication time to work in her system. She needs to keep her appointment for 1/11/2022 as scheduled. She was informed to use the Cepacol throat lozenges to soothe her throat. She then stated that her throat was not really sore, but just a little irritated. I asked that she starts using the Lozenges and Tylenol along with her nasal sprays and to keep her appointment. She was told to see her PCP if she starts with any fever. Patient understood.

## 2021-12-28 NOTE — TELEPHONE ENCOUNTER
I called the patient back and she stated that she was  having a sorethroat and feeling a little bad this morning. I told her to continue with her medications and to take Tylenol if needed for discomfort. I advised her to keep her follow up appointment. She was told to call her PCP if she starts with any fever.

## 2021-12-30 ENCOUNTER — TELEPHONE (OUTPATIENT)
Dept: ENT CLINIC | Age: 61
End: 2021-12-30

## 2021-12-30 NOTE — TELEPHONE ENCOUNTER
I returned patient's call and she stated that she had a slight cough and she coughed up a little yellowish sputum. I told her that she may need to call or see her PCP, for she may need to be checked for that. She stated that she started taking a cough medication that she had from a prior visit with her PCP and she was going to see if it would help first.  I advised her to keep her appointment for her follow up on 1/11/2022.

## 2022-01-11 ENCOUNTER — TELEPHONE (OUTPATIENT)
Dept: ENT CLINIC | Age: 62
End: 2022-01-11

## 2022-01-11 NOTE — TELEPHONE ENCOUNTER
Pt called stating that she would like a call back from [facility] about her medication because she was wondering if she needed a refill

## 2022-01-12 DIAGNOSIS — R09.82 PND (POST-NASAL DRIP): Primary | ICD-10-CM

## 2022-01-12 RX ORDER — AZELASTINE 1 MG/ML
SPRAY, METERED NASAL
Qty: 1 EACH | Refills: 0 | Status: SHIPPED | OUTPATIENT
Start: 2022-01-12 | End: 2022-01-21

## 2022-01-12 NOTE — TELEPHONE ENCOUNTER
Patient called back to the office and stated that she went to see her PCP 12/29/2021 and she tested positive for Covid. She stated that she was quarantined for 2 weeks. Patient rescheduled the appointment for 3/1/2022. Refill sent for her Astelin nasal spray so she can continue to use nightly up to her appointment time.

## 2022-01-12 NOTE — TELEPHONE ENCOUNTER
I returned patient's call earlier and was told that she had an important call coming through and asked if I could call her back in a few minutes. I am in clinic today seeing patient's and I did not call her back right away. Patient called me back and I was still with patients, so message was left for me to call her. I have tried twice more to call, and went to voice mail. I left a message for her to call back to the office and ask to speak with nurse with any concerns or questions.

## 2022-01-20 DIAGNOSIS — R09.82 PND (POST-NASAL DRIP): ICD-10-CM

## 2022-01-21 RX ORDER — AZELASTINE 1 MG/ML
SPRAY, METERED NASAL
Qty: 1 EACH | Refills: 1 | Status: SHIPPED | OUTPATIENT
Start: 2022-01-21 | End: 2022-05-24 | Stop reason: SDUPTHER

## 2022-02-09 ENCOUNTER — TELEPHONE (OUTPATIENT)
Dept: ENT CLINIC | Age: 62
End: 2022-02-09

## 2022-04-18 ENCOUNTER — HOSPITAL ENCOUNTER (EMERGENCY)
Age: 62
Discharge: ANOTHER HOSPITAL | End: 2022-04-18
Attending: EMERGENCY MEDICINE
Payer: MEDICAID

## 2022-04-18 VITALS
SYSTOLIC BLOOD PRESSURE: 127 MMHG | HEART RATE: 68 BPM | TEMPERATURE: 98.4 F | DIASTOLIC BLOOD PRESSURE: 61 MMHG | RESPIRATION RATE: 16 BRPM | OXYGEN SATURATION: 100 %

## 2022-04-18 DIAGNOSIS — I10 PRIMARY HYPERTENSION: Primary | ICD-10-CM

## 2022-04-18 LAB
ALBUMIN SERPL-MCNC: 3.6 G/DL (ref 3.5–5)
ALBUMIN/GLOB SERPL: 0.9 {RATIO} (ref 1.1–2.2)
ALP SERPL-CCNC: 127 U/L (ref 45–117)
ALT SERPL-CCNC: 26 U/L (ref 12–78)
ANION GAP SERPL CALC-SCNC: 7 MMOL/L (ref 5–15)
AST SERPL W P-5'-P-CCNC: 25 U/L (ref 15–37)
BASOPHILS # BLD: 0 K/UL (ref 0–0.2)
BASOPHILS NFR BLD: 0 % (ref 0–2.5)
BILIRUB SERPL-MCNC: 0.5 MG/DL (ref 0.2–1)
BUN SERPL-MCNC: 8 MG/DL (ref 6–20)
BUN/CREAT SERPL: 6 (ref 12–20)
CA-I BLD-MCNC: 8.7 MG/DL (ref 8.5–10.1)
CHLORIDE SERPL-SCNC: 104 MMOL/L (ref 97–108)
CO2 SERPL-SCNC: 29 MMOL/L (ref 21–32)
CREAT SERPL-MCNC: 1.24 MG/DL (ref 0.55–1.02)
EOSINOPHIL # BLD: 0.1 K/UL (ref 0–0.7)
EOSINOPHIL NFR BLD: 3 % (ref 0.9–2.9)
ERYTHROCYTE [DISTWIDTH] IN BLOOD BY AUTOMATED COUNT: 14.2 % (ref 11.5–14.5)
GLOBULIN SER CALC-MCNC: 3.9 G/DL (ref 2–4)
GLUCOSE SERPL-MCNC: 188 MG/DL (ref 65–100)
HCT VFR BLD AUTO: 32.7 % (ref 36–46)
HGB BLD-MCNC: 10.8 G/DL (ref 13.5–17.5)
LYMPHOCYTES # BLD: 2.1 K/UL (ref 1–4.8)
LYMPHOCYTES NFR BLD: 42 % (ref 20.5–51.1)
MCH RBC QN AUTO: 29.9 PG (ref 31–34)
MCHC RBC AUTO-ENTMCNC: 33.1 G/DL (ref 31–36)
MCV RBC AUTO: 90.3 FL (ref 80–100)
MONOCYTES # BLD: 0.6 K/UL (ref 0.2–2.4)
MONOCYTES NFR BLD: 11 % (ref 1.7–9.3)
NEUTS SEG # BLD: 2.2 K/UL (ref 1.8–7.7)
NEUTS SEG NFR BLD: 44 % (ref 42–75)
NRBC # BLD: 0.01 K/UL
NRBC BLD-RTO: 0.2 PER 100 WBC
PLATELET # BLD AUTO: 194 K/UL (ref 150–400)
PMV BLD AUTO: 8 FL (ref 6.5–11.5)
POTASSIUM SERPL-SCNC: 3.4 MMOL/L (ref 3.5–5.1)
PROT SERPL-MCNC: 7.5 G/DL (ref 6.4–8.2)
RBC # BLD AUTO: 3.63 M/UL (ref 4.5–5.9)
SODIUM SERPL-SCNC: 140 MMOL/L (ref 136–145)
WBC # BLD AUTO: 5 K/UL (ref 4.4–11.3)

## 2022-04-18 PROCEDURE — 74011250637 HC RX REV CODE- 250/637: Performed by: EMERGENCY MEDICINE

## 2022-04-18 PROCEDURE — 99283 EMERGENCY DEPT VISIT LOW MDM: CPT

## 2022-04-18 PROCEDURE — 36415 COLL VENOUS BLD VENIPUNCTURE: CPT

## 2022-04-18 PROCEDURE — 85025 COMPLETE CBC W/AUTO DIFF WBC: CPT

## 2022-04-18 PROCEDURE — 80053 COMPREHEN METABOLIC PANEL: CPT

## 2022-04-18 RX ORDER — POTASSIUM CHLORIDE 20 MEQ/1
40 TABLET, EXTENDED RELEASE ORAL
Status: COMPLETED | OUTPATIENT
Start: 2022-04-18 | End: 2022-04-18

## 2022-04-18 RX ADMIN — POTASSIUM CHLORIDE 40 MEQ: 20 TABLET, EXTENDED RELEASE ORAL at 21:17

## 2022-04-18 NOTE — ED TRIAGE NOTES
Pt reports taking her blood pressure prior to arrival and it was 225/100. Denies headache or visual changes. States at the time she felt dizzy but denies dizziness right now. Denies any recent illness or medication changes.

## 2022-04-19 NOTE — ED PROVIDER NOTES
EMERGENCY DEPARTMENT HISTORY AND PHYSICAL EXAM  ?    Date: 4/18/2022  Patient Name: Manju Rabago    History of Presenting Illness    Patient presents with:  Hypertension      History Provided By: Patient    HPI: Manju Rabago, 64 y.o. female with a past medical history significant for diabetes, hypertension and hyperlipidemia presents to the ED with cc of dizziness this evening. She noted her blood pressure systolic was 915 over 087 or so. She denied numbness or extremity weakness or slurred speech. By the time she got here her pressure is 177/84. Dizziness has improved. She endorses compliance with blood pressure medicine amlodipine and losartan. There are no other complaints, changes, or physical findings at this time. PCP: Jero Maria MD    Current Outpatient Medications:  amLODIPine (NORVASC) 5 mg tablet, Take 5 mg by mouth daily. , Disp: , Rfl:   docusate sodium (Colace) 100 mg capsule, Take 100 mg by mouth nightly., Disp: , Rfl:   ergocalciferol (Vitamin D2) 1,250 mcg (50,000 unit) capsule, Take 50,000 Units by mouth every seven (7) days. Takes on Thursdays, Disp: , Rfl:   aspirin 81 mg chewable tablet, Take 81 mg by mouth daily. , Disp: , Rfl:   loratadine (Claritin) 10 mg tablet, Take 10 mg by mouth., Disp: , Rfl:   omeprazole (PRILOSEC) 20 mg capsule, Take 20 mg by mouth daily as needed. , Disp: , Rfl:   acarbose (PRECOSE) 100 mg tablet, Take 100 mg by mouth three (3) times daily (with meals). , Disp: , Rfl:   losartan (COZAAR) 100 mg tablet, Take 100 mg by mouth daily. , Disp: , Rfl:   ezetimibe (ZETIA) 10 mg tablet, Take 10 mg by mouth daily. , Disp: , Rfl:   atorvastatin (LIPITOR) 80 mg tablet, Take 80 mg by mouth daily. , Disp: , Rfl:   azelastine (ASTELIN) 137 mcg (0.1 %) nasal spray, USE 1 SPRAY(S) IN EACH NOSTRIL ONCE DAILY AT BEDTIME, Disp: 1 Each, Rfl: 1  docusate sodium (Colace) 100 mg capsule, Take 100 mg by mouth two (2) times a day., Disp: , Rfl:   cycloSPORINE (Restasis) 0.05 % dpet, Administer 1 Drop to both eyes every twelve (12) hours. , Disp: , Rfl:   prednisoLONE acetate (PRED FORTE) 1 % ophthalmic suspension, Administer 1 Drop to both eyes three (3) times daily. Indications: Pt takes 2x a day, Disp: , Rfl:   azithromycin (Zithromax Z-Alexandru) 250 mg tablet, Dental infection  Indications: a bacterial infection of the middle ear, Disp: 5 Tablet, Rfl: 0  fluticasone propionate (FLONASE) 50 mcg/actuation nasal spray, 2 Sprays by Both Nostrils route daily. Indications: chronic stuffy and runny nose not caused by allergies, Disp: , Rfl:         Past History    Past Medical History:  Past Medical History:  No date: Allergies  No date: Diabetes ([address]  No date: Hypercholesteremia  No date: Hypertension    Past Surgical History:  Past Surgical History:  No date: HX BACK SURGERY  No date: HX  SECTION    Family History:  History reviewed. No pertinent family history. Social History:  Social History   Tobacco Use     Smoking status: Never Smoker     Smokeless tobacco: Never Used   Alcohol use: Not Currently   Drug use: Never      Allergies:  -- Nsaids (Non-Steroidal Anti-Inflammatory Drug) -- Unable to Obtain  -- Amoxicillin -- Hives      Review of Systems  @[facility]@    Physical Exam  [unfilled]    Diagnostic Study Results    Labs -  No results found for this or any previous visit (from the past 12 hour(s)). Radiologic Studies -   No orders to display  CT Results  (Last 48 hours)   None     CXR Results  (Last 48 hours)   None       Medical Decision Making and ED Course  I am the first provider for this patient. I reviewed the vital signs, available nursing notes, past medical history, past surgical history, family history and social history. Vital Signs-Reviewed the patient's vital signs. Empty flowsheet group. Records Reviewed: Nursing Notes and Old Medical Records    Provider Notes (Medical Decision Making):   Observed for blood pressure change.   Check potassium. The patient presents with hypertensive emergency, CVA, hypokalemia. ED Course:   Blood pressure returned to normal spontaneously. Potassium was mildly low. Patient was given 40 mill equivalents of potassium orally. Initial assessment performed. The patients presenting problems have been discussed, and they are in agreement with the care plan formulated and outlined with them. I have encouraged them to ask questions as they arise throughout their visit. Disposition    Hypertension        DISCHARGE PLAN:  1. Current Discharge Medication List    CONTINUE these medications which have NOT CHANGED    amLODIPine (NORVASC) 5 mg tablet  Take 5 mg by mouth daily. !! docusate sodium (Colace) 100 mg capsule  Take 100 mg by mouth nightly.    ergocalciferol (Vitamin D2) 1,250 mcg (50,000 unit) capsule  Take 50,000 Units by mouth every seven (7) days. Takes on Thursdays    aspirin 81 mg chewable tablet  Take 81 mg by mouth daily. loratadine (Claritin) 10 mg tablet  Take 10 mg by mouth. omeprazole (PRILOSEC) 20 mg capsule  Take 20 mg by mouth daily as needed. acarbose (PRECOSE) 100 mg tablet  Take 100 mg by mouth three (3) times daily (with meals). losartan (COZAAR) 100 mg tablet  Take 100 mg by mouth daily. ezetimibe (ZETIA) 10 mg tablet  Take 10 mg by mouth daily. atorvastatin (LIPITOR) 80 mg tablet  Take 80 mg by mouth daily. azelastine (ASTELIN) 137 mcg (0.1 %) nasal spray  USE 1 SPRAY(S) IN EACH NOSTRIL ONCE DAILY AT BEDTIME  Qty: 1 Each Refills: 1  Associated Diagnoses:PND (post-nasal drip)    ! ! docusate sodium (Colace) 100 mg capsule  Take 100 mg by mouth two (2) times a day. cycloSPORINE (Restasis) 0.05 % dpet  Administer 1 Drop to both eyes every twelve (12) hours. prednisoLONE acetate (PRED FORTE) 1 % ophthalmic suspension  Administer 1 Drop to both eyes three (3) times daily.  Indications: Pt takes 2x a day    azithromycin (Zithromax Z-Alexandru) 250 mg tablet  Dental infection  Indications: a bacterial infection of the middle ear  Qty: 5 Tablet Refills: 0    fluticasone propionate (FLONASE) 50 mcg/actuation nasal spray  2 Sprays by Both Nostrils route daily. Indications: chronic stuffy and runny nose not caused by allergies    ! ! - Potential duplicate medications found. Please discuss with provider. 2. Follow-up Information    None    3. Return to ED if worse     Diagnosis    Clinical Impression: No diagnosis found. Attestations:    Alex Le MD    Please note that this dictation was completed with [employer], the [employer] voice recognition software. Quite often unanticipated grammatical, syntax, homophones, and other interpretive errors are inadvertently transcribed by the computer software. Please disregard these errors. Please excuse any errors that have escaped final proofreading. Thank you.    ? Past Medical History:   Diagnosis Date    Allergies     Diabetes ([facility] [address]     Hypercholesteremia     Hypertension        Past Surgical History:   Procedure Laterality Date    HX BACK SURGERY      HX  SECTION           History reviewed. No pertinent family history.     Social History     Socioeconomic History    Marital status: SINGLE     Spouse name: Not on file    Number of children: Not on file    Years of education: Not on file    Highest education level: Not on file   Occupational History    Not on file   Tobacco Use    Smoking status: Never Smoker    Smokeless tobacco: Never Used   Substance and Sexual Activity    Alcohol use: Not Currently    Drug use: Never    Sexual activity: Not on file   Other Topics Concern    Not on file   Social History Narrative    Not on file     Social Determinants of Health     Financial Resource Strain:     Difficulty of Paying Living Expenses: Not on file   Food Insecurity:     Worried About Running Out of Food in the Last Year: Not on file    Mendy of Food in the Last Year: Not on file   Transportation Needs:     Lack of Transportation (Medical): Not on file    Lack of Transportation (Non-Medical): Not on file   Physical Activity:     Days of Exercise per Week: Not on file    Minutes of Exercise per Session: Not on file   Stress:     Feeling of Stress : Not on file   Social Connections:     Frequency of Communication with Friends and Family: Not on file    Frequency of Social Gatherings with Friends and Family: Not on file    Attends Jainism Services: Not on file    Active Member of [address] [place] [address] or Organizations: Not on file    Attends Club or Organization Meetings: Not on file    Marital Status: Not on file   Intimate Partner Violence:     Fear of Current or Ex-Partner: Not on file    Emotionally Abused: Not on file    Physically Abused: Not on file    Sexually Abused: Not on file   Housing Stability:     Unable to Pay for Housing in the Last Year: Not on file    Number of [place] in the Last Year: Not on file    Unstable Housing in the Last Year: Not on file         ALLERGIES: Nsaids (non-steroidal anti-inflammatory drug) and Amoxicillin    Review of Systems   Constitutional: Negative. HENT: Negative. Eyes: Negative. Respiratory: Negative. Cardiovascular: Negative. Gastrointestinal: Negative. Endocrine: Negative. Genitourinary: Negative. Musculoskeletal: Negative. Neurological: Positive for dizziness. Negative for speech difficulty, weakness, numbness and headaches. Hematological: Negative. Psychiatric/Behavioral: Negative. Vitals:    04/18/22 1949   BP: (!) 177/84   Pulse: 87   Resp: 16   Temp: 98.4 °F (36.9 °C)   SpO2: 99%            Physical Exam  Vitals and nursing note reviewed. Constitutional:       Appearance: Normal appearance. HENT:      Head: Normocephalic and atraumatic. Nose: Nose normal.      Mouth/Throat:      Mouth: Mucous membranes are moist.      Pharynx: Oropharynx is clear.    Eyes:      Extraocular Movements: Extraocular movements intact. Conjunctiva/sclera: Conjunctivae normal.      Pupils: Pupils are equal, round, and reactive to light. Cardiovascular:      Rate and Rhythm: Normal rate and regular rhythm. Pulses: Normal pulses. Heart sounds: Normal heart sounds. Pulmonary:      Effort: Pulmonary effort is normal.      Breath sounds: Normal breath sounds. Abdominal:      General: Abdomen is flat. Bowel sounds are normal.      Palpations: Abdomen is soft. Musculoskeletal:         General: Normal range of motion. Cervical back: Normal range of motion and neck supple. Skin:     General: Skin is warm and dry. Neurological:      General: No focal deficit present. Mental Status: She is alert and oriented to person, place, and time.    Psychiatric:         Mood and Affect: Mood normal.         Behavior: Behavior normal.          MDM       Procedures

## 2022-05-24 ENCOUNTER — OFFICE VISIT (OUTPATIENT)
Dept: ENT CLINIC | Age: 62
End: 2022-05-24
Payer: MEDICAID

## 2022-05-24 VITALS
RESPIRATION RATE: 18 BRPM | HEIGHT: 65 IN | BODY MASS INDEX: 25.33 KG/M2 | OXYGEN SATURATION: 98 % | HEART RATE: 79 BPM | WEIGHT: 152 LBS | SYSTOLIC BLOOD PRESSURE: 140 MMHG | DIASTOLIC BLOOD PRESSURE: 80 MMHG

## 2022-05-24 DIAGNOSIS — R09.82 PND (POST-NASAL DRIP): ICD-10-CM

## 2022-05-24 DIAGNOSIS — J30.9 ALLERGIC RHINITIS, UNSPECIFIED SEASONALITY, UNSPECIFIED TRIGGER: ICD-10-CM

## 2022-05-24 DIAGNOSIS — H93.13 TINNITUS OF BOTH EARS: Primary | ICD-10-CM

## 2022-05-24 PROCEDURE — G8754 DIAS BP LESS 90: HCPCS | Performed by: OTOLARYNGOLOGY

## 2022-05-24 PROCEDURE — 99214 OFFICE O/P EST MOD 30 MIN: CPT | Performed by: OTOLARYNGOLOGY

## 2022-05-24 PROCEDURE — G8510 SCR DEP NEG, NO PLAN REQD: HCPCS | Performed by: OTOLARYNGOLOGY

## 2022-05-24 PROCEDURE — G8753 SYS BP > OR = 140: HCPCS | Performed by: OTOLARYNGOLOGY

## 2022-05-24 PROCEDURE — G8419 CALC BMI OUT NRM PARAM NOF/U: HCPCS | Performed by: OTOLARYNGOLOGY

## 2022-05-24 PROCEDURE — 3017F COLORECTAL CA SCREEN DOC REV: CPT | Performed by: OTOLARYNGOLOGY

## 2022-05-24 PROCEDURE — G8427 DOCREV CUR MEDS BY ELIG CLIN: HCPCS | Performed by: OTOLARYNGOLOGY

## 2022-05-24 PROCEDURE — G9899 SCRN MAM PERF RSLTS DOC: HCPCS | Performed by: OTOLARYNGOLOGY

## 2022-05-24 RX ORDER — AZELASTINE 1 MG/ML
SPRAY, METERED NASAL
Qty: 1 EACH | Refills: 1 | Status: SHIPPED | OUTPATIENT
Start: 2022-05-24

## 2022-05-24 NOTE — PROGRESS NOTES
Otolaryngology-Head and Neck Surgery     Patient: Pee Dan  YOB: 1960  MRN: [id number]  Date of Service: 2022    Chief Complaint: Allergy symptoms, tinnitus    History of Present Illness: Pee Dan is a 58y.o. year old female who presents today for discussion of    Reports \"allergy symptoms\" for past 6 months  +PND, nasal congestion, fluid in ears, tinnitus (buzz)  Denies hearing changes, dizziness, balance issues  Takes Claritin, started last week; Flonase daily for 1 month.   No ENT surgical Hx  No pertinent family Hx    22 - f/u PND - tinnitus - pt has been on flonase, astelin and claritin - she does feel like the sprays are helping as long as used daily; tinnitus has been longstanding, she has not had a hearing test; pt does feel like she may have been allergy tested before but cannot remember; also made worse by certain odors; no occupational noise exposure; also denies any hearing loss    Past Medical History:  Past Medical History:   Diagnosis Date    Allergies     Diabetes ([address]     Hypercholesteremia     Hypertension        Past Surgical History:   Past Surgical History:   Procedure Laterality Date    HX BACK SURGERY      HX  SECTION         Medications:   Current Outpatient Medications   Medication Instructions    acarbose (PRECOSE) 100 mg, Oral, 3 TIMES DAILY WITH MEALS    amLODIPine (NORVASC) 5 mg, Oral, DAILY    aspirin 81 mg, Oral, DAILY    atorvastatin (LIPITOR) 80 mg, Oral, DAILY    azelastine (ASTELIN) 137 mcg (0.1 %) nasal spray USE 1 SPRAY(S) IN EACH NOSTRIL ONCE DAILY AT BEDTIME    azithromycin (Zithromax Z-Alexandru) 250 mg tablet Dental infection    cycloSPORINE (Restasis) 0.05 % dpet 1 Drop, Both Eyes, EVERY 12 HOURS    docusate sodium (COLACE) 100 mg, Oral, EVERY BEDTIME    docusate sodium (COLACE) 100 mg, Oral, 2 TIMES DAILY    ergocalciferol (VITAMIN D2) 50,000 Units, Oral, EVERY 7 DAYS, Takes on      ezetimibe (ZETIA) 10 mg, Oral, DAILY  fluticasone propionate (FLONASE) 50 mcg/actuation nasal spray 2 Sprays, Both Nostrils, DAILY    loratadine (CLARITIN) 10 mg, Oral    losartan (COZAAR) 100 mg, Oral, DAILY    omeprazole (PRILOSEC) 20 mg, Oral, DAILY AS NEEDED    prednisoLONE acetate (PRED FORTE) 1 % ophthalmic suspension 1 Drop, Both Eyes, 3 TIMES DAILY       Allergies: Allergies   Allergen Reactions    Nsaids (Non-Steroidal Anti-Inflammatory Drug) Unable to Obtain    Amoxicillin Hives       Social History:   Social History     Tobacco Use    Smoking status: Never Smoker    Smokeless tobacco: Never Used   Substance Use Topics    Alcohol use: Not Currently    Drug use: Never        Family History:  History reviewed. No pertinent family history. Review of Systems:    Consitutional: denies fever, excessive weight gain or loss. Eyes: denies diplopia, eye pain. Integumentary: denies new concerning skin lesions. Ears, Nose, Mouth, Throat: denies except as per HPI. Endocrine: denies hot or cold intolerance, increased thirst.  Respiratory: denies cough, hemoptysis, wheezing  Gastrointestinal: denies trouble swallowing, nausea, emesis, regurgitation  Musculoskeletal: denies muscle weakness or wasting  Cardiovascular: denies chest pain, shortness of breath  Neurologic: denies seizures, numbness or tingling, syncope  Hematologic: denies easy bleeding or bruising    Physical Examination:   Vitals:    05/24/22 1304   BP: (!) 140/80   BP 1 Location: Left upper arm   BP Patient Position: Sitting   BP Cuff Size: Adult   Pulse: 79   Resp: 18   Height: 5' 5\" (1.651 m)   Weight: 152 lb (68.9 kg)   SpO2: 98%        General: Comfortable, pleasant, appears stated age  Voice: Strong, speaking in full sentences, no stridor    Face: No masses or lesions, facial strength symmetric   Ears: External ears unremarkable. Bilateral ear canal clear. Tympanic membrane clear and intact, with visible landmarks.  Clear middle ear space  Nose: External nose unremarkable. Dorsum midline. Anterior rhinoscopy demonstrates no lesions. Septum midline. Turbinates pale with minimal hypertrophy. Oral Cavity / Oropharynx: No trismus. Mucosa pink and moist. No lesions. Tongue is midline and mobile. Palate elevates symmetrically. Uvula midline. Tonsils unremarkable. Base of tongue soft. Floor of mouth soft. Neck: Supple. No adenopathy. Thyroid unremarkable. Palpable laryngeal landmarks. Full neck range of motion   Neurologic: CN II - XI intact. Normal gait      Assessment and Plan:   1. PND   2. Tinnitus bilateral ears  3. Chronic rhinitis    - she is well controlled on Astelin/Flonase daily and claritin PO  - ear exam is clear. Discussed etiologies for tinnitus.   - consider allergy testing if medical therapy stops helping

## 2022-07-25 ENCOUNTER — TELEPHONE (OUTPATIENT)
Dept: ENT CLINIC | Age: 62
End: 2022-07-25

## 2022-07-25 NOTE — TELEPHONE ENCOUNTER
Spoke with pt intending to confirm appt scheduled for 7/26 with Dr. Tana Bruner and Dr. Asuncion Mishra. Pt stated she will not be able to make the appt and will reschedule. I asked if she would like to go ahead and reschedule or call back to reschedule when convenient, pt stated she will call back to reschedule at a later time.

## 2022-09-03 ENCOUNTER — HOSPITAL ENCOUNTER (EMERGENCY)
Age: 62
Discharge: HOME | End: 2022-09-03
Attending: EMERGENCY MEDICINE
Payer: MEDICARE

## 2022-09-03 VITALS
BODY MASS INDEX: 29.74 KG/M2 | WEIGHT: 178.5 LBS | DIASTOLIC BLOOD PRESSURE: 71 MMHG | TEMPERATURE: 98.3 F | HEART RATE: 76 BPM | OXYGEN SATURATION: 97 % | HEIGHT: 65 IN | SYSTOLIC BLOOD PRESSURE: 113 MMHG | RESPIRATION RATE: 16 BRPM

## 2022-09-03 DIAGNOSIS — R14.0 BLOATING SYMPTOM: ICD-10-CM

## 2022-09-03 DIAGNOSIS — B37.31 VAGINAL YEAST INFECTION: Primary | ICD-10-CM

## 2022-09-03 PROCEDURE — 99283 EMERGENCY DEPT VISIT LOW MDM: CPT

## 2022-09-03 RX ORDER — NYSTATIN 100000 [USP'U]/G
POWDER TOPICAL 4 TIMES DAILY
Qty: 15 G | Refills: 0 | Status: SHIPPED | OUTPATIENT
Start: 2022-09-03

## 2022-09-03 RX ORDER — HYDROXYZINE PAMOATE 25 MG/1
25 CAPSULE ORAL
Qty: 30 CAPSULE | Refills: 0 | Status: SHIPPED | OUTPATIENT
Start: 2022-09-03 | End: 2022-09-17

## 2022-09-03 RX ORDER — ASPIRIN 81 MG/1
81 TABLET ORAL DAILY
Qty: 100 TABLET | Refills: 0 | Status: SHIPPED | OUTPATIENT
Start: 2022-09-03

## 2022-09-03 RX ORDER — FLUCONAZOLE 150 MG/1
150 TABLET ORAL DAILY
Qty: 1 TABLET | Refills: 1 | Status: SHIPPED | OUTPATIENT
Start: 2022-09-03 | End: 2022-09-04

## 2022-09-03 NOTE — ED PROVIDER NOTES
EMERGENCY DEPARTMENT HISTORY AND PHYSICAL EXAM      Date: 9/3/2022  Patient Name: Claudene Phy    History of Presenting Illness     Chief Complaint   Patient presents with    Vaginal Itching     Vaginal itching started 4 days ago. Using miconazole, and clotrimazole PCP physician prescribed. History Provided By: Patient    HPI: Claudene Phy, 58 y.o. female  presenting to the ed with vaginal itching for one week. Seen by PCP and diagnosed with vaginal yeast infection. Has been using clotrimazole cream and vaginal suppositories but states itching persists. States has appointment with GYN at 9/13 but wanted to be checked out. She reports some abdomianl bloating and urinary frequency which she is attributing to increased water intake and taking miralax for constipatino. She declines pelvic exam and urinalysis stating she has PMD workup scheduled for 9/13 and \"they will do it\" Denies burning or other pain. Denies vaginal discharge. There are no other complaints, changes, or physical findings at this time. PCP: Alicia Strickland MD    No current facility-administered medications on file prior to encounter. Current Outpatient Medications on File Prior to Encounter   Medication Sig Dispense Refill    azelastine (ASTELIN) 137 mcg (0.1 %) nasal spray USE 1 SPRAY(S) IN EACH NOSTRIL ONCE DAILY AT BEDTIME 1 Each 1    docusate sodium (Colace) 100 mg capsule Take 100 mg by mouth two (2) times a day. cycloSPORINE (Restasis) 0.05 % dpet Administer 1 Drop to both eyes every twelve (12) hours. amLODIPine (NORVASC) 5 mg tablet Take 5 mg by mouth daily. prednisoLONE acetate (PRED FORTE) 1 % ophthalmic suspension Administer 1 Drop to both eyes three (3) times daily.  Indications: Pt takes 2x a day      [DISCONTINUED] azithromycin (Zithromax Z-Alexandru) 250 mg tablet Dental infection  Indications: a bacterial infection of the middle ear 5 Tablet 0    docusate sodium (Colace) 100 mg capsule Take 100 mg by mouth nightly.      ergocalciferol (Vitamin D2) 1,250 mcg (50,000 unit) capsule Take 50,000 Units by mouth every seven (7) days. Takes on       fluticasone propionate (FLONASE) 50 mcg/actuation nasal spray 2 Sprays by Both Nostrils route daily. Indications: chronic stuffy and runny nose not caused by allergies      loratadine (Claritin) 10 mg tablet Take 10 mg by mouth. [DISCONTINUED] aspirin 81 mg chewable tablet Take 81 mg by mouth daily. omeprazole (PRILOSEC) 20 mg capsule Take 20 mg by mouth daily as needed. acarbose (PRECOSE) 100 mg tablet Take 100 mg by mouth three (3) times daily (with meals). losartan (COZAAR) 100 mg tablet Take 100 mg by mouth daily. ezetimibe (ZETIA) 10 mg tablet Take 10 mg by mouth daily. atorvastatin (LIPITOR) 80 mg tablet Take 80 mg by mouth daily. Past History     Past Medical History:  Past Medical History:   Diagnosis Date    Allergies     Diabetes ([facility] [address]     Hypercholesteremia     Hypertension        Past Surgical History:  Past Surgical History:   Procedure Laterality Date    HX BACK SURGERY      HX  SECTION         Family History:  No family history on file. Social History:  Social History     Tobacco Use    Smoking status: Never    Smokeless tobacco: Never   Substance Use Topics    Alcohol use: Not Currently    Drug use: Never       Allergies: Allergies   Allergen Reactions    Lisinopril Swelling    Nsaids (Non-Steroidal Anti-Inflammatory Drug) Unable to Obtain    Amoxicillin Hives       Review of Systems   Review of Systems   Constitutional:  Negative for appetite change, fatigue and fever. HENT:  Negative for congestion and ear pain. Respiratory:  Negative for cough, chest tightness and shortness of breath. Cardiovascular:  Negative for chest pain, palpitations and leg swelling. Gastrointestinal:  Negative for abdominal pain, diarrhea, nausea and vomiting.         Abdominal bloating     Genitourinary:  Positive for frequency. Negative for decreased urine volume, flank pain, genital sores, pelvic pain, urgency, vaginal bleeding, vaginal discharge and vaginal pain. Vaginal itching     Musculoskeletal:  Negative for arthralgias, back pain, gait problem and myalgias. Skin:  Negative for rash. Neurological:  Negative for dizziness and headaches. Psychiatric/Behavioral:  Negative for suicidal ideas. All other systems reviewed and are negative. Physical Exam   Physical Exam  Vitals and nursing note reviewed. Constitutional:       Appearance: Normal appearance. HENT:      Head: Normocephalic and atraumatic. Right Ear: External ear normal.      Left Ear: External ear normal.      Nose: Nose normal.      Mouth/Throat:      Mouth: Mucous membranes are moist.   Eyes:      Conjunctiva/sclera: Conjunctivae normal.   Cardiovascular:      Rate and Rhythm: Normal rate and regular rhythm. Pulmonary:      Effort: Pulmonary effort is normal.   Abdominal:      General: Abdomen is flat. There is no distension. Tenderness: There is no abdominal tenderness. There is no guarding. Genitourinary:     Comments: Discussed pelvic exam. Pt declines stating she has one scheduled 9/13. Musculoskeletal:         General: Normal range of motion. Cervical back: Normal range of motion. Skin:     General: Skin is warm and dry. Capillary Refill: Capillary refill takes less than 2 seconds. Neurological:      Mental Status: She is alert. Mental status is at baseline. Psychiatric:         Mood and Affect: Mood normal.         Thought Content: Thought content normal.           Medical Decision Making and ED Course   Differential Diagnosis & Medical Decision Making Provider Note:   65yo female presenting with vaginal itching. Declines pelvic exam or urinalysis. Advised to stop using moist compresses to help with itching. Will instead use powders and antihisamines for itching and will rx oral thearpy.      - I am the first provider for this patient. I reviewed the vital signs, available nursing notes, past medical history, past surgical history, family history and social history. The patients presenting problems have been discussed, and they are in agreement with the care plan formulated and outlined with them. I have encouraged them to ask questions as they arise throughout their visit. Vital Signs-Reviewed the patient's vital signs. Patient Vitals for the past 12 hrs:   Temp Pulse Resp BP SpO2   09/03/22 0811 98.3 °F (36.8 °C) 76 16 113/71 97 %       ED Course:         Disposition   Disposition: Condition stable  DC- Adult Discharges: All of the diagnostic tests were reviewed and questions answered. Diagnosis, care plan and treatment options were discussed. The patient understands the instructions and will follow up as directed. The patients results have been reviewed with them. They have been counseled regarding their diagnosis. The patient verbally convey understanding and agreement of the signs, symptoms, diagnosis, treatment and prognosis and additionally agrees to follow up as recommended with their PCP in 24 - 48 hours. They also agree with the care-plan and convey that all of their questions have been answered. I have also put together some discharge instructions for them that include: 1) educational information regarding their diagnosis, 2) how to care for their diagnosis at home, as well a 3) list of reasons why they would want to return to the ED prior to their follow-up appointment, should their condition change. DC-The patient was given verbal abdominal pain warning signs and and follow-up instructions  DC- Pain Control DC Home plan: Nonsteroidals, Tylenol, and Referral Family Medicine/PCP    DISCHARGE PLAN:  1.    Current Discharge Medication List        CONTINUE these medications which have NOT CHANGED    Details   azelastine (ASTELIN) 137 mcg (0.1 %) nasal spray USE 1 SPRAY(S) IN EACH NOSTRIL ONCE DAILY AT BEDTIME  Qty: 1 Each, Refills: 1    Associated Diagnoses: PND (post-nasal drip)      ! ! docusate sodium (Colace) 100 mg capsule Take 100 mg by mouth two (2) times a day. cycloSPORINE (Restasis) 0.05 % dpet Administer 1 Drop to both eyes every twelve (12) hours. amLODIPine (NORVASC) 5 mg tablet Take 5 mg by mouth daily. prednisoLONE acetate (PRED FORTE) 1 % ophthalmic suspension Administer 1 Drop to both eyes three (3) times daily. Indications: Pt takes 2x a day      azithromycin (Zithromax Z-Alexandru) 250 mg tablet Dental infection  Indications: a bacterial infection of the middle ear  Qty: 5 Tablet, Refills: 0      !! docusate sodium (Colace) 100 mg capsule Take 100 mg by mouth nightly.      ergocalciferol (Vitamin D2) 1,250 mcg (50,000 unit) capsule Take 50,000 Units by mouth every seven (7) days. Takes on Thursdays      aspirin 81 mg chewable tablet Take 81 mg by mouth daily. fluticasone propionate (FLONASE) 50 mcg/actuation nasal spray 2 Sprays by Both Nostrils route daily. Indications: chronic stuffy and runny nose not caused by allergies      loratadine (Claritin) 10 mg tablet Take 10 mg by mouth. omeprazole (PRILOSEC) 20 mg capsule Take 20 mg by mouth daily as needed. acarbose (PRECOSE) 100 mg tablet Take 100 mg by mouth three (3) times daily (with meals). losartan (COZAAR) 100 mg tablet Take 100 mg by mouth daily. ezetimibe (ZETIA) 10 mg tablet Take 10 mg by mouth daily. atorvastatin (LIPITOR) 80 mg tablet Take 80 mg by mouth daily. !! - Potential duplicate medications found. Please discuss with provider. 2.   Follow-up Information       Follow up With Specialties Details Why [address] EMERGENCY DEPT Emergency Medicine Go to  As needed, or for any concerns or deteriorations. , if symptoms persist or worsen.  [facility]    Facundo Thrasher MD Internal Medicine Physician   [address] [facility] [address] [facility]  [phone]            3. Return to ED if worse   4. Current Discharge Medication List        START taking these medications    Details   fluconazole (DIFLUCAN) 150 mg tablet Take 1 Tablet by mouth daily for 1 day. If symptoms persist, take the refill one week after taking the dose today. Qty: 1 Tablet, Refills: 1  Start date: 9/3/2022, End date: 9/4/2022      aspirin delayed-release (Daniel Low Dose Aspirin) 81 mg tablet Take 1 Tablet by mouth daily. Qty: 100 Tablet, Refills: 0  Start date: 9/3/2022      nystatin (MYCOSTATIN) powder Apply  to affected area four (4) times daily. Qty: 15 g, Refills: 0  Start date: 9/3/2022      hydrOXYzine pamoate (VistariL) 25 mg capsule Take 1 Capsule by mouth three (3) times daily as needed for Itching for up to 14 days. Qty: 30 Capsule, Refills: 0  Start date: 9/3/2022, End date: 9/17/2022           STOP taking these medications       aspirin 81 mg chewable tablet Comments:   Reason for Stopping:                Diagnosis/Clinical Impression     Clinical Impression:   1. Vaginal yeast infection    2. Bloating symptom        Attestations: Vinicio Chinchilla MD, am the primary clinician of record. Please note that this dictation was completed with [employer] the [employer] voice recognition software. Quite often unanticipated grammatical, syntax, homophones, and other interpretive errors are inadvertently transcribed by the computer software. Please disregard these errors. Please excuse any errors that have escaped final proofreading. Thank you.

## 2022-11-22 ENCOUNTER — OFFICE VISIT (OUTPATIENT)
Dept: ENT CLINIC | Age: 62
End: 2022-11-22
Payer: MEDICARE

## 2022-11-22 VITALS
DIASTOLIC BLOOD PRESSURE: 82 MMHG | OXYGEN SATURATION: 98 % | BODY MASS INDEX: 29.66 KG/M2 | WEIGHT: 178 LBS | HEART RATE: 99 BPM | HEIGHT: 65 IN | SYSTOLIC BLOOD PRESSURE: 134 MMHG | RESPIRATION RATE: 17 BRPM

## 2022-11-22 DIAGNOSIS — J30.9 ALLERGIC RHINITIS, UNSPECIFIED SEASONALITY, UNSPECIFIED TRIGGER: ICD-10-CM

## 2022-11-22 DIAGNOSIS — H93.13 TINNITUS OF BOTH EARS: Primary | ICD-10-CM

## 2022-11-22 DIAGNOSIS — K21.9 LARYNGOPHARYNGEAL REFLUX (LPR): ICD-10-CM

## 2022-11-22 DIAGNOSIS — R09.82 PND (POST-NASAL DRIP): ICD-10-CM

## 2022-11-22 DIAGNOSIS — H60.8X3 CHRONIC ECZEMATOUS OTITIS EXTERNA OF BOTH EARS: ICD-10-CM

## 2022-11-22 PROCEDURE — G8419 CALC BMI OUT NRM PARAM NOF/U: HCPCS | Performed by: OTOLARYNGOLOGY

## 2022-11-22 PROCEDURE — 3017F COLORECTAL CA SCREEN DOC REV: CPT | Performed by: OTOLARYNGOLOGY

## 2022-11-22 PROCEDURE — G8752 SYS BP LESS 140: HCPCS | Performed by: OTOLARYNGOLOGY

## 2022-11-22 PROCEDURE — G8754 DIAS BP LESS 90: HCPCS | Performed by: OTOLARYNGOLOGY

## 2022-11-22 PROCEDURE — 99214 OFFICE O/P EST MOD 30 MIN: CPT | Performed by: OTOLARYNGOLOGY

## 2022-11-22 PROCEDURE — G9899 SCRN MAM PERF RSLTS DOC: HCPCS | Performed by: OTOLARYNGOLOGY

## 2022-11-22 PROCEDURE — G8510 SCR DEP NEG, NO PLAN REQD: HCPCS | Performed by: OTOLARYNGOLOGY

## 2022-11-22 PROCEDURE — G8427 DOCREV CUR MEDS BY ELIG CLIN: HCPCS | Performed by: OTOLARYNGOLOGY

## 2022-11-22 RX ORDER — FLUOCINOLONE ACETONIDE 0.11 MG/ML
OIL AURICULAR (OTIC)
Qty: 20 ML | Refills: 1 | Status: SHIPPED | OUTPATIENT
Start: 2022-11-22

## 2022-11-22 NOTE — LETTER
11/22/2022    Patient: Anton Clark   YOB: 1960   Date of Visit: 11/22/2022     Johnnie King MD  [address] [facility] [address]  Via Fax: [phone]    Dear Johnnie King MD,      Thank you for referring Ms. Anton Clark to [facility] for evaluation. My notes for this consultation are attached. If you have questions, please do not hesitate to call me. I look forward to following your patient along with you.       Sincerely,    Niraj Chamorro MD

## 2022-11-22 NOTE — PROGRESS NOTES
Otolaryngology-Head and Neck Surgery     Patient: Julián Man  YOB: 1960  MRN: [id number]  Date of Service: 2022    Chief Complaint: Allergy symptoms, tinnitus    History of Present Illness: Julián Man is a 58y.o. year old female who presents today for discussion of    Reports \"allergy symptoms\" for past 6 months  +PND, nasal congestion, fluid in ears, tinnitus (buzz)  Denies hearing changes, dizziness, balance issues  Takes Claritin, started last week; Flonase daily for 1 month.   No ENT surgical Hx  No pertinent family Hx    22 - f/u PND - tinnitus - pt has been on flonase, astelin and claritin - she does feel like the sprays are helping as long as used daily; tinnitus has been longstanding, she has not had a hearing test; pt does feel like she may have been allergy tested before but cannot remember; also made worse by certain odors; no occupational noise exposure; also denies any hearing loss    22  6 mos fu - pt remains on claritin - she is on astelin only, stopped flonase - she is c/o nasal congestion, ear itching, and PND, she is waking up choking at times; mineral oil helps with the ear    Past Medical History:  Past Medical History:   Diagnosis Date    Allergies     Diabetes ([address]     Hypercholesteremia     Hypertension        Past Surgical History:   Past Surgical History:   Procedure Laterality Date    HX BACK SURGERY      HX  SECTION         Medications:   Current Outpatient Medications   Medication Instructions    acarbose (PRECOSE) 100 mg, Oral, 3 TIMES DAILY WITH MEALS    amLODIPine (NORVASC) 5 mg, Oral, DAILY    aspirin delayed-release (AUDREY LOW DOSE ASPIRIN) 81 mg, Oral, DAILY    atorvastatin (LIPITOR) 80 mg, Oral, DAILY    azelastine (ASTELIN) 137 mcg (0.1 %) nasal spray USE 1 SPRAY(S) IN EACH NOSTRIL ONCE DAILY AT BEDTIME    cycloSPORINE (Restasis) 0.05 % dpet 1 Drop, Both Eyes, EVERY 12 HOURS    docusate sodium (COLACE) 100 mg, Oral, EVERY BEDTIME    docusate sodium (COLACE) 100 mg, Oral, 2 TIMES DAILY    ergocalciferol (VITAMIN D2) 50,000 Units, Oral, EVERY 7 DAYS, Takes on Thursdays     ezetimibe (ZETIA) 10 mg, Oral, DAILY    fluocinolone acetonide oiL (DermOtic Oil) 0.01 % drop 4 drops daily as needed to ears for itching    fluticasone propionate (FLONASE) 50 mcg/actuation nasal spray 2 Sprays, Both Nostrils, DAILY    loratadine (CLARITIN) 10 mg, Oral    losartan (COZAAR) 100 mg, Oral, DAILY    nystatin (MYCOSTATIN) powder Topical, 4 TIMES DAILY    omeprazole (PRILOSEC) 20 mg, Oral, DAILY AS NEEDED    prednisoLONE acetate (PRED FORTE) 1 % ophthalmic suspension 1 Drop, Both Eyes, 3 TIMES DAILY       Allergies: Allergies   Allergen Reactions    Lisinopril Swelling    Nsaids (Non-Steroidal Anti-Inflammatory Drug) Unable to Obtain    Amoxicillin Hives       Social History:   Social History     Tobacco Use    Smoking status: Never    Smokeless tobacco: Never   Substance Use Topics    Alcohol use: Not Currently    Drug use: Never        Family History:  History reviewed. No pertinent family history. Review of Systems:    Consitutional: denies fever, excessive weight gain or loss. Eyes: denies diplopia, eye pain. Integumentary: denies new concerning skin lesions. Ears, Nose, Mouth, Throat: denies except as per HPI.   Endocrine: denies hot or cold intolerance, increased thirst.  Respiratory: denies cough, hemoptysis, wheezing  Gastrointestinal: denies trouble swallowing, nausea, emesis, regurgitation  Musculoskeletal: denies muscle weakness or wasting  Cardiovascular: denies chest pain, shortness of breath  Neurologic: denies seizures, numbness or tingling, syncope  Hematologic: denies easy bleeding or bruising    Physical Examination:   Vitals:    11/22/22 1258   BP: 134/82   BP 1 Location: Left upper arm   BP Patient Position: Sitting   BP Cuff Size: Adult   Pulse: 99   Resp: 17   Height: 5' 5\" (1.651 m)   Weight: 178 lb (80.7 kg)   SpO2: 98%        General: Comfortable, pleasant, appears stated age  Voice: Strong, speaking in full sentences, no stridor    Face: No masses or lesions, facial strength symmetric   Ears: External ears unremarkable. Bilateral ear canal clear. Tympanic membrane clear and intact, with visible landmarks. Clear middle ear space  Nose: External nose unremarkable. Dorsum midline. Anterior rhinoscopy demonstrates no lesions. Septum midline. Turbinates pale with minimal hypertrophy. Oral Cavity / Oropharynx: No trismus. Mucosa pink and moist. No lesions. Tongue is midline and mobile. Palate elevates symmetrically. Uvula midline. Tonsils unremarkable. Base of tongue soft. Floor of mouth soft. Neck: Supple. No adenopathy. Thyroid unremarkable. Palpable laryngeal landmarks. Full neck range of motion   Neurologic: CN II - XI intact. Normal gait      Assessment and Plan:   PND   Tinnitus bilateral ears  Chronic rhinitis  Chronic OE  LPR  ? Night terrors/ADITHYA    - will add dermotic oil  -cont astelin  -change omeprazole to evenings  -consider ADITHYA, consider night terrors - ?  Needs sleep study

## 2022-11-28 ENCOUNTER — TELEPHONE (OUTPATIENT)
Dept: ENT CLINIC | Age: 62
End: 2022-11-28

## 2022-11-28 ENCOUNTER — TRANSCRIBE ORDER (OUTPATIENT)
Dept: SCHEDULING | Age: 62
End: 2022-11-28

## 2022-11-28 DIAGNOSIS — Z12.31 ENCOUNTER FOR SCREENING MAMMOGRAM FOR MALIGNANT NEOPLASM OF BREAST: Primary | ICD-10-CM

## 2022-11-28 NOTE — TELEPHONE ENCOUNTER
Pt called,states her fluocinolone acetonide gtts are working for the ear itching but wants to know how long to use. I told her as needed but would check with you and someone would call her back.  Elvis

## 2022-11-30 ENCOUNTER — TELEPHONE (OUTPATIENT)
Dept: ENT CLINIC | Age: 62
End: 2022-11-30

## 2022-11-30 NOTE — TELEPHONE ENCOUNTER
Second phone call to pt-LMVM regarding the use of her ear gtts for itching prn or as needed. To keep it under control can keep using twice a week once under controlled.  Elvis Contraindicated

## 2022-12-28 DIAGNOSIS — R09.82 PND (POST-NASAL DRIP): ICD-10-CM

## 2022-12-28 RX ORDER — AZELASTINE 1 MG/ML
SPRAY, METERED NASAL
Qty: 1 EACH | Refills: 3 | Status: SHIPPED | OUTPATIENT
Start: 2022-12-28

## 2023-03-15 ENCOUNTER — TELEPHONE (OUTPATIENT)
Dept: ENT CLINIC | Age: 63
End: 2023-03-15

## 2023-03-15 NOTE — TELEPHONE ENCOUNTER
Received call via answering service     Spoke with patient re: worsening tinnitus   Discussed tinnitus management strategies    She wonders about something occluding her ear    She has follow up in May, we can see if we can move up her appt     MD Samantha [place] [address] E[facility] Allergy  [facility] [address] [facility]  Office Phone: [phone]

## 2023-04-22 DIAGNOSIS — Z12.31 ENCOUNTER FOR SCREENING MAMMOGRAM FOR MALIGNANT NEOPLASM OF BREAST: Primary | ICD-10-CM

## 2023-05-09 ENCOUNTER — OFFICE VISIT (OUTPATIENT)
Age: 63
End: 2023-05-09

## 2023-05-09 VITALS
HEART RATE: 75 BPM | OXYGEN SATURATION: 98 % | HEIGHT: 65 IN | SYSTOLIC BLOOD PRESSURE: 144 MMHG | BODY MASS INDEX: 30.56 KG/M2 | WEIGHT: 183.4 LBS | RESPIRATION RATE: 20 BRPM | DIASTOLIC BLOOD PRESSURE: 80 MMHG

## 2023-05-09 DIAGNOSIS — H93.13 TINNITUS AURIUM, BILATERAL: Primary | ICD-10-CM

## 2023-05-09 DIAGNOSIS — H60.8X3 CHRONIC ECZEMATOUS OTITIS EXTERNA OF BOTH EARS: ICD-10-CM

## 2023-05-09 DIAGNOSIS — J30.1 ALLERGIC RHINITIS DUE TO POLLEN, UNSPECIFIED SEASONALITY: ICD-10-CM

## 2023-05-09 RX ORDER — AMLODIPINE BESYLATE 5 MG/1
5 TABLET ORAL DAILY
COMMUNITY
Start: 2023-04-28

## 2023-05-09 RX ORDER — ACARBOSE 100 MG/1
TABLET ORAL
COMMUNITY
Start: 2023-05-01

## 2023-05-09 RX ORDER — TRIAMCINOLONE ACETONIDE 1 MG/G
CREAM TOPICAL
COMMUNITY
Start: 2023-04-19 | End: 2023-05-09 | Stop reason: ALTCHOICE

## 2023-05-09 RX ORDER — LORATADINE 10 MG/1
10 TABLET ORAL DAILY
COMMUNITY
Start: 2023-04-28

## 2023-05-09 RX ORDER — EZETIMIBE 10 MG/1
10 TABLET ORAL DAILY
COMMUNITY
Start: 2023-02-23

## 2023-05-09 RX ORDER — ATORVASTATIN CALCIUM 80 MG/1
80 TABLET, FILM COATED ORAL NIGHTLY
COMMUNITY
Start: 2023-02-23

## 2023-05-09 RX ORDER — OMEPRAZOLE 20 MG/1
20 CAPSULE, DELAYED RELEASE ORAL DAILY
COMMUNITY
Start: 2023-02-23

## 2023-05-09 RX ORDER — AZELASTINE HYDROCHLORIDE 137 UG/1
SPRAY, METERED NASAL
COMMUNITY
Start: 2023-04-28

## 2023-05-09 RX ORDER — LOSARTAN POTASSIUM 100 MG/1
100 TABLET ORAL DAILY
COMMUNITY
Start: 2023-02-23

## 2023-05-09 RX ORDER — FLUTICASONE PROPIONATE 50 MCG
2 SPRAY, SUSPENSION (ML) NASAL DAILY
Qty: 16 G | Refills: 0 | Status: SHIPPED | OUTPATIENT
Start: 2023-05-09

## 2023-05-09 RX ORDER — TRIAMCINOLONE ACETONIDE 1 MG/G
CREAM TOPICAL
Qty: 28.4 G | Refills: 1 | Status: SHIPPED | OUTPATIENT
Start: 2023-05-09

## 2023-05-09 RX ORDER — POTASSIUM CHLORIDE 750 MG/1
10 TABLET, EXTENDED RELEASE ORAL DAILY
COMMUNITY
Start: 2023-04-28

## 2023-05-09 RX ORDER — CYCLOSPORINE 0.5 MG/ML
EMULSION OPHTHALMIC
COMMUNITY
Start: 2023-04-06

## 2023-05-09 ASSESSMENT — ENCOUNTER SYMPTOMS
GASTROINTESTINAL NEGATIVE: 1
RESPIRATORY NEGATIVE: 1
SINUS PRESSURE: 1
EYES NEGATIVE: 1

## 2023-05-09 NOTE — PROGRESS NOTES
Vitals:    05/09/23 1116   BP: (!) 144/80   Pulse: 75   Resp: 20   SpO2: 98%   Weight: 183 lb 6.4 oz (83.2 kg)   Height: 5' 5\" (1.651 m)      Body mass index is 30.52 kg/m².
impacted cerumen. Left Ear: Tympanic membrane, ear canal and external ear normal. There is no impacted cerumen. Ears:      Comments: Bilateral canals dry and erythematous, no drainage       Nose: Nose normal. No congestion or rhinorrhea. Mouth/Throat:      Mouth: Mucous membranes are moist.      Pharynx: Oropharynx is clear. No oropharyngeal exudate or posterior oropharyngeal erythema. Eyes:      Extraocular Movements: Extraocular movements intact. Conjunctiva/sclera: Conjunctivae normal.      Pupils: Pupils are equal, round, and reactive to light. Cardiovascular:      Rate and Rhythm: Normal rate and regular rhythm. Pulmonary:      Effort: Pulmonary effort is normal.      Breath sounds: Normal breath sounds. Abdominal:      General: Abdomen is flat. Palpations: Abdomen is soft. Musculoskeletal:         General: Normal range of motion. Cervical back: Normal range of motion and neck supple. Skin:     General: Skin is warm and dry. Capillary Refill: Capillary refill takes less than 2 seconds. Neurological:      General: No focal deficit present. Mental Status: She is alert and oriented to person, place, and time. Mental status is at baseline. Psychiatric:         Mood and Affect: Mood normal.         Behavior: Behavior normal.         Thought Content: Thought content normal.         Judgment: Judgment normal.        Assessment/Plan:     Encounter Diagnoses   Name Primary?     Tinnitus aurium, bilateral Yes    Chronic eczematous otitis externa of both ears     Allergic rhinitis due to pollen, unspecified seasonality      Tinnitus  - Tinnitus handout given  - Masking techniques discussed    Eczematous otitis media bilaterally  - Kenalog cream to both ears daily, up to twice a day if needed     Allergic Rhinitis  - Add flonase in the morning, continue astelin at night       Orders Placed This Encounter    amLODIPine (NORVASC) 5 MG tablet     Sig: Take 1 tablet by

## 2023-05-21 RX ORDER — FLUOCINOLONE ACETONIDE 0.11 MG/ML
OIL AURICULAR (OTIC)
COMMUNITY
Start: 2022-11-22 | End: 2023-06-29

## 2023-05-21 RX ORDER — ERGOCALCIFEROL 1.25 MG/1
50000 CAPSULE ORAL
COMMUNITY

## 2023-05-21 RX ORDER — ASPIRIN 81 MG/1
81 TABLET ORAL DAILY
COMMUNITY
Start: 2022-09-03

## 2023-05-21 RX ORDER — NYSTATIN 100000 [USP'U]/G
POWDER TOPICAL 4 TIMES DAILY
COMMUNITY
Start: 2022-09-03

## 2023-05-21 RX ORDER — PREDNISOLONE ACETATE 10 MG/ML
1 SUSPENSION/ DROPS OPHTHALMIC 3 TIMES DAILY
COMMUNITY

## 2023-05-21 RX ORDER — PSEUDOEPHEDRINE HCL 30 MG
100 TABLET ORAL NIGHTLY
COMMUNITY

## 2023-05-23 ENCOUNTER — OFFICE VISIT (OUTPATIENT)
Age: 63
End: 2023-05-23
Payer: MEDICARE

## 2023-05-23 VITALS
DIASTOLIC BLOOD PRESSURE: 80 MMHG | RESPIRATION RATE: 16 BRPM | OXYGEN SATURATION: 98 % | HEIGHT: 65 IN | BODY MASS INDEX: 30.49 KG/M2 | WEIGHT: 183 LBS | SYSTOLIC BLOOD PRESSURE: 122 MMHG | HEART RATE: 75 BPM

## 2023-05-23 DIAGNOSIS — H60.8X3 CHRONIC ECZEMATOUS OTITIS EXTERNA OF BOTH EARS: ICD-10-CM

## 2023-05-23 DIAGNOSIS — J30.1 ALLERGIC RHINITIS DUE TO POLLEN, UNSPECIFIED SEASONALITY: ICD-10-CM

## 2023-05-23 DIAGNOSIS — H93.13 TINNITUS AURIUM, BILATERAL: Primary | ICD-10-CM

## 2023-05-23 PROCEDURE — 99213 OFFICE O/P EST LOW 20 MIN: CPT | Performed by: OTOLARYNGOLOGY

## 2023-05-23 ASSESSMENT — ENCOUNTER SYMPTOMS
SHORTNESS OF BREATH: 0
COUGH: 0
STRIDOR: 0
SINUS PAIN: 0
VOMITING: 0
SINUS PRESSURE: 0
CHOKING: 0
PHOTOPHOBIA: 0
NAUSEA: 0
VOICE CHANGE: 0
APNEA: 0
ABDOMINAL PAIN: 0
EYE ITCHING: 0
BACK PAIN: 0
SORE THROAT: 0
EYE DISCHARGE: 0
TROUBLE SWALLOWING: 0
WHEEZING: 0

## 2023-05-23 NOTE — PROGRESS NOTES
Subjective:    Vladimir Valles   61 y.o.   1960     Followup Visit    History of Present Illness:    Chief Complaint: Allergy symptoms, tinnitus     History of Present Illness: Vladimir Valles is a 58y.o. year old female who presents today for discussion of    Reports \"allergy symptoms\" for past 6 months  +PND, nasal congestion, fluid in ears, tinnitus (buzz)  Denies hearing changes, dizziness, balance issues  Takes Claritin, started last week; Flonase daily for 1 month. No ENT surgical Hx  No pertinent family Hx     5/24/22 - f/u PND - tinnitus - pt has been on flonase, astelin and claritin - she does feel like the sprays are helping as long as used daily; tinnitus has been longstanding, she has not had a hearing test; pt does feel like she may have been allergy tested before but cannot remember; also made worse by certain odors; no occupational noise exposure; also denies any hearing loss     11/22/22  6 mos fu - pt remains on claritin - she is on astelin only, stopped flonase - she is c/o nasal congestion, ear itching, and PND, she is waking up choking at times; mineral oil helps with the ear    5/23/23  6 mos fu - had some ear itching/pain around 1 mo ago - saw Govind Martin and was given flonase and steroid cream for her ears. She felt the cream was clumping. She had been using the oil drops    Review of Systems  Review of Systems   Constitutional:  Negative for appetite change, chills, fatigue and fever. HENT:  Positive for congestion. Negative for ear discharge, ear pain, nosebleeds, postnasal drip, sinus pressure, sinus pain, sneezing, sore throat, tinnitus, trouble swallowing and voice change. Eyes:  Negative for photophobia, discharge, itching and visual disturbance. Respiratory:  Negative for apnea, cough, choking, shortness of breath, wheezing and stridor. Cardiovascular:  Negative for chest pain and palpitations. Gastrointestinal:  Negative for abdominal pain, nausea and vomiting.    Endocrine: Negative

## 2023-06-29 RX ORDER — FLUOCINOLONE ACETONIDE 0.11 MG/ML
OIL AURICULAR (OTIC)
Qty: 20 ML | Refills: 0 | Status: SHIPPED | OUTPATIENT
Start: 2023-06-29

## 2023-09-07 ENCOUNTER — TELEPHONE (OUTPATIENT)
Age: 63
End: 2023-09-07

## 2023-09-07 NOTE — TELEPHONE ENCOUNTER
Attempted to reach the pt to let her know that Dr. Jessy Nazario is no longer going to the [facility] location and that her appt on 11/28 needs to be r/s to either a different provider at the [facility] location or to Dr. Jessy Nazario in [facility]. Someone else answered the pts phone and stated she was not available to come to the phone and that she would call back later.

## 2023-12-04 ENCOUNTER — OFFICE VISIT (OUTPATIENT)
Age: 63
End: 2023-12-04
Payer: MEDICARE

## 2023-12-04 VITALS
OXYGEN SATURATION: 96 % | HEART RATE: 82 BPM | RESPIRATION RATE: 15 BRPM | BODY MASS INDEX: 30.92 KG/M2 | TEMPERATURE: 98.2 F | WEIGHT: 185.6 LBS | HEIGHT: 65 IN | SYSTOLIC BLOOD PRESSURE: 136 MMHG | DIASTOLIC BLOOD PRESSURE: 75 MMHG

## 2023-12-04 DIAGNOSIS — L72.0 EPIDERMOID CYST: Primary | ICD-10-CM

## 2023-12-04 PROCEDURE — G8484 FLU IMMUNIZE NO ADMIN: HCPCS | Performed by: COLON & RECTAL SURGERY

## 2023-12-04 PROCEDURE — G8417 CALC BMI ABV UP PARAM F/U: HCPCS | Performed by: COLON & RECTAL SURGERY

## 2023-12-04 PROCEDURE — 1036F TOBACCO NON-USER: CPT | Performed by: COLON & RECTAL SURGERY

## 2023-12-04 PROCEDURE — 3017F COLORECTAL CA SCREEN DOC REV: CPT | Performed by: COLON & RECTAL SURGERY

## 2023-12-04 PROCEDURE — 99204 OFFICE O/P NEW MOD 45 MIN: CPT | Performed by: COLON & RECTAL SURGERY

## 2023-12-04 PROCEDURE — G8427 DOCREV CUR MEDS BY ELIG CLIN: HCPCS | Performed by: COLON & RECTAL SURGERY

## 2023-12-04 ASSESSMENT — PATIENT HEALTH QUESTIONNAIRE - PHQ9
SUM OF ALL RESPONSES TO PHQ9 QUESTIONS 1 & 2: 0
2. FEELING DOWN, DEPRESSED OR HOPELESS: NOT AT ALL
SUM OF ALL RESPONSES TO PHQ QUESTIONS 1-9: 0
1. LITTLE INTEREST OR PLEASURE IN DOING THINGS: NOT AT ALL

## 2023-12-11 PROBLEM — L72.0 EPIDERMOID CYST: Status: ACTIVE | Noted: 2023-12-11

## 2023-12-12 ENCOUNTER — PREP FOR PROCEDURE (OUTPATIENT)
Age: 63
End: 2023-12-12

## 2023-12-12 ENCOUNTER — TELEPHONE (OUTPATIENT)
Age: 63
End: 2023-12-12

## 2023-12-28 ENCOUNTER — PREP FOR PROCEDURE (OUTPATIENT)
Age: 63
End: 2023-12-28

## 2023-12-28 DIAGNOSIS — L72.0 EPIDERMOID CYST OF SKIN: ICD-10-CM

## 2024-01-09 ENCOUNTER — HOSPITAL ENCOUNTER (OUTPATIENT)
Facility: HOSPITAL | Age: 64
Discharge: HOME | End: 2024-01-12

## 2024-01-09 NOTE — DISCHARGE INSTRUCTIONS
May shower starting tomorrow, do not soak incision  Avoid excessive reaching with the right arm for 1 to 2 days  Follow-up my office 2 weeks

## 2024-01-15 ENCOUNTER — HOSPITAL ENCOUNTER (OUTPATIENT)
Facility: HOSPITAL | Age: 64
Setting detail: OUTPATIENT SURGERY
Discharge: HOME | End: 2024-01-15
Attending: COLON & RECTAL SURGERY | Admitting: COLON & RECTAL SURGERY
Payer: MEDICAID

## 2024-01-15 VITALS
RESPIRATION RATE: 18 BRPM | SYSTOLIC BLOOD PRESSURE: 117 MMHG | BODY MASS INDEX: 30.66 KG/M2 | HEIGHT: 65 IN | HEART RATE: 67 BPM | WEIGHT: 184 LBS | OXYGEN SATURATION: 99 % | TEMPERATURE: 97.7 F | DIASTOLIC BLOOD PRESSURE: 62 MMHG

## 2024-01-15 DIAGNOSIS — L72.0 EPIDERMOID CYST OF SKIN: Primary | ICD-10-CM

## 2024-01-15 LAB
GLUCOSE BLD STRIP.AUTO-MCNC: 123 MG/DL (ref 65–100)
PERFORMED BY:: ABNORMAL

## 2024-01-15 PROCEDURE — 2709999900 HC NON-CHARGEABLE SUPPLY: Performed by: COLON & RECTAL SURGERY

## 2024-01-15 PROCEDURE — 6360000002 HC RX W HCPCS: Performed by: COLON & RECTAL SURGERY

## 2024-01-15 PROCEDURE — 2500000003 HC RX 250 WO HCPCS: Performed by: COLON & RECTAL SURGERY

## 2024-01-15 PROCEDURE — 7100000010 HC PHASE II RECOVERY - FIRST 15 MIN: Performed by: COLON & RECTAL SURGERY

## 2024-01-15 PROCEDURE — 82962 GLUCOSE BLOOD TEST: CPT

## 2024-01-15 PROCEDURE — 2580000003 HC RX 258: Performed by: COLON & RECTAL SURGERY

## 2024-01-15 PROCEDURE — 88304 TISSUE EXAM BY PATHOLOGIST: CPT

## 2024-01-15 PROCEDURE — 3600000012 HC SURGERY LEVEL 2 ADDTL 15MIN: Performed by: COLON & RECTAL SURGERY

## 2024-01-15 PROCEDURE — 3600000002 HC SURGERY LEVEL 2 BASE: Performed by: COLON & RECTAL SURGERY

## 2024-01-15 RX ORDER — TRAMADOL HYDROCHLORIDE 50 MG/1
50 TABLET ORAL EVERY 6 HOURS PRN
Qty: 12 TABLET | Refills: 0 | Status: SHIPPED | OUTPATIENT
Start: 2024-01-15 | End: 2024-01-18

## 2024-01-15 RX ORDER — BUPIVACAINE HYDROCHLORIDE AND EPINEPHRINE 5; 5 MG/ML; UG/ML
INJECTION, SOLUTION EPIDURAL; INTRACAUDAL; PERINEURAL PRN
Status: DISCONTINUED | OUTPATIENT
Start: 2024-01-15 | End: 2024-01-15 | Stop reason: ALTCHOICE

## 2024-01-15 RX ORDER — CLINDAMYCIN PHOSPHATE 600 MG/50ML
600 INJECTION, SOLUTION INTRAVENOUS ONCE
Status: COMPLETED | OUTPATIENT
Start: 2024-01-15 | End: 2024-01-15

## 2024-01-15 RX ORDER — 0.9 % SODIUM CHLORIDE 0.9 %
1000 INTRAVENOUS SOLUTION INTRAVENOUS ONCE
Status: COMPLETED | OUTPATIENT
Start: 2024-01-15 | End: 2024-01-15

## 2024-01-15 RX ADMIN — SODIUM CHLORIDE 1000 ML: 9 INJECTION, SOLUTION INTRAVENOUS at 07:15

## 2024-01-15 RX ADMIN — CLINDAMYCIN PHOSPHATE 600 MG: 600 INJECTION, SOLUTION INTRAVENOUS at 08:48

## 2024-01-15 ASSESSMENT — PAIN - FUNCTIONAL ASSESSMENT
PAIN_FUNCTIONAL_ASSESSMENT: 0-10

## 2024-01-15 ASSESSMENT — PAIN SCALES - GENERAL
PAINLEVEL_OUTOF10: 0

## 2024-01-15 NOTE — H&P
data recorded.    Data Review:   Recent Results (from the past 24 hour(s))   POCT Glucose    Collection Time: 01/15/24  7:18 AM   Result Value Ref Range    POC Glucose 123 (H) 65 - 100 mg/dL    Performed by: GELACIO MARIA        Physical Exam    Constitutional:       General: She is not in acute distress.     Appearance: Normal appearance. She is obese. She is not ill-appearing.   HENT:      Head: Normocephalic and atraumatic.   Cardiovascular:      Rate and Rhythm: Normal rate and regular rhythm.   Pulmonary:      Effort: Pulmonary effort is normal.   Abdominal:      General: There is no distension.      Palpations: Abdomen is soft. There is no mass.      Tenderness: There is no abdominal tenderness.   Musculoskeletal:         General: Normal range of motion.      Cervical back: Normal range of motion and neck supple.   Skin:     General: Skin is warm and dry.      Comments: Small subcentimeter epidermoid cyst right upper extremity no surrounding skin changes   Neurological:      General: No focal deficit present.      Mental Status: She is alert and oriented to person, place, and time.   Psychiatric:         Mood and Affect: Mood normal.         Thought Content: Thought content normal.         Judgment: Judgment normal.       Assessment and plan:    I once again discussed surgery with the patient and reviewed the risk and benefits.  I reviewed the risk of infection, bleeding, wound complications, recurrence.  She wishes to proceed.  She would like to do the surgery under just straight local with no sedation.  Her consent is obtained and on the chart.  Surgery is scheduled for today.

## 2024-01-15 NOTE — INTERVAL H&P NOTE
Update History & Physical    The patient's History and Physical of January 15, 2024 was reviewed with the patient and I examined the patient. There was no change. The surgical site was confirmed by the patient and me.     Plan: The risks, benefits, expected outcome, and alternative to the recommended procedure have been discussed with the patient. Patient understands and wants to proceed with the procedure.     Electronically signed by DEE DALY MD on 1/15/2024 at 8:49 AM

## 2024-01-15 NOTE — OP NOTE
Operative Note      Patient: Ruth Jeong  YOB: 1960  MRN: [id number]    Date of Procedure: 1/15/2024    Pre-Op Diagnosis Codes:     * Epidermoid cyst of skin [L72.0]    Post-Op Diagnosis: Same       Procedure(s):  EXCISION OF EPIDERMOID CYST OF RIGHT UPPER ARM    Surgeon(s):  Jemma Olea MD    Assistant:   * No surgical staff found *    Anesthesia: Local    Estimated Blood Loss (mL): Minimal    Complications: None    Specimens:   ID Type Source Tests Collected by Time Destination   1 : right uypper arm epidermoid cyst Tissue Arm SURGICAL PATHOLOGY Jemma Olea MD 1/15/2024 0914        Implants:  * No implants in log *      Drains: * No LDAs found *    Findings: Approximately 7 mm epidermoid cyst    This procedure was not performed to treat primary cutaneous melanoma through wide local excision      Detailed Description of Procedure: The patient was brought to the operating room and positioned on the operating table in the left lateral decubitus position.  Following the placement of appropriate anesthetic monitoring devices the area of the posterior left upper arm was prepped and draped in the standard sterile fashion.  A surgical timeout was performed at which time the patient's identity and surgical procedure were once then confirmed.    Approximately 2 cm elliptical incision encompassing the cyst was mapped out and a ring block achieved with 0.5% Marcaine with epinephrine.  The scalpel was used to incise the skin and the cyst excised this entirety using electrocautery.  Care was taken to avoid entering the cyst cavity.    Additional 0.5% Marcaine with epinephrine was infiltrated the operative field and skin flaps elevated in both directions.  3-0 Vicryl sutures were used to close the deep dermal layer and a 4-0 Monocryl suture was used to close the skin in subcuticular fashion.  Dermabond dressing was applied and the procedure terminated.    The patient returned to supine

## 2024-01-20 ENCOUNTER — TELEPHONE (OUTPATIENT)
Age: 64
End: 2024-01-20

## 2024-01-20 RX ORDER — FLUOCINOLONE ACETONIDE 0.11 MG/ML
OIL AURICULAR (OTIC)
Qty: 20 ML | Refills: 3 | Status: SHIPPED | OUTPATIENT
Start: 2024-01-20

## 2024-01-20 NOTE — TELEPHONE ENCOUNTER
Patient called asking for a refill on her ear drops. Her ear is itching and painful again.     Dermotic ear drops refill. Patient has follow up with Dr. Hoffman, advised her to continue routine follow up for re-evaluation.     Rhoda Delarosa MD   formerly [facility]  [address]  [place] [address]  Office Phone: [phone]

## 2024-01-22 RX ORDER — FLUOCINOLONE ACETONIDE 0.11 MG/ML
OIL AURICULAR (OTIC)
Qty: 20 ML | Refills: 0 | OUTPATIENT
Start: 2024-01-22

## 2024-02-05 ENCOUNTER — OFFICE VISIT (OUTPATIENT)
Age: 64
End: 2024-02-05

## 2024-02-05 VITALS
WEIGHT: 185 LBS | HEIGHT: 65 IN | RESPIRATION RATE: 17 BRPM | HEART RATE: 81 BPM | OXYGEN SATURATION: 95 % | TEMPERATURE: 98.5 F | SYSTOLIC BLOOD PRESSURE: 114 MMHG | BODY MASS INDEX: 30.82 KG/M2 | DIASTOLIC BLOOD PRESSURE: 69 MMHG

## 2024-02-05 DIAGNOSIS — L72.0 EPIDERMOID CYST OF SKIN: Primary | ICD-10-CM

## 2024-02-05 PROCEDURE — 99024 POSTOP FOLLOW-UP VISIT: CPT | Performed by: COLON & RECTAL SURGERY

## 2024-02-05 ASSESSMENT — PATIENT HEALTH QUESTIONNAIRE - PHQ9
SUM OF ALL RESPONSES TO PHQ QUESTIONS 1-9: 0
SUM OF ALL RESPONSES TO PHQ QUESTIONS 1-9: 0
1. LITTLE INTEREST OR PLEASURE IN DOING THINGS: 0
2. FEELING DOWN, DEPRESSED OR HOPELESS: 0
SUM OF ALL RESPONSES TO PHQ QUESTIONS 1-9: 0
SUM OF ALL RESPONSES TO PHQ QUESTIONS 1-9: 0
SUM OF ALL RESPONSES TO PHQ9 QUESTIONS 1 & 2: 0

## 2024-02-05 NOTE — PROGRESS NOTES
Ms. Jeong comes in today for follow-up status post excision of epidermoid cyst of right upper arm on January 15, 2024.  She has no complaints today.  She denies any complications or drainage at her incision.    Physical exam: On examination her incision site is healing beautifully.  There is no erythema or induration of surrounding tissues.    Assessment and plan: I am pleased to see that Ms. Jeong is doing well.  She will follow-up with me on an as-needed basis.

## 2024-02-05 NOTE — PROGRESS NOTES
Identified pt with two pt identifiers (name and ). Reviewed chart in preparation for visit and have obtained necessary documentation.    Ruth Jeong is a 63 y.o. female  Chief Complaint   Patient presents with    Post-Op Check     01/15/2024 excision of epidermoid cyst     /69 (Site: Right Upper Arm, Position: Sitting)   Pulse 81   Temp 98.5 °F (36.9 °C) (Temporal)   Resp 17   Ht 1.651 m (5' 5\")   Wt 83.9 kg (185 lb)   SpO2 95%   BMI 30.79 kg/m²     1. Have you been to the ER, urgent care clinic since your last visit?  Hospitalized since your last visit?NO    2. Have you seen or consulted any other health care providers outside of the [facility] since your last visit?  Include any pap smears or colon screening. Yes

## 2024-02-15 ENCOUNTER — TELEPHONE (OUTPATIENT)
Age: 64
End: 2024-02-15

## 2024-02-17 ENCOUNTER — HOSPITAL ENCOUNTER (EMERGENCY)
Facility: HOSPITAL | Age: 64
Discharge: HOME | End: 2024-02-17
Attending: EMERGENCY MEDICINE
Payer: MEDICAID

## 2024-02-17 VITALS
DIASTOLIC BLOOD PRESSURE: 85 MMHG | HEART RATE: 85 BPM | OXYGEN SATURATION: 99 % | TEMPERATURE: 98.1 F | SYSTOLIC BLOOD PRESSURE: 152 MMHG | RESPIRATION RATE: 18 BRPM

## 2024-02-17 DIAGNOSIS — H93.19 TINNITUS, UNSPECIFIED LATERALITY: Primary | ICD-10-CM

## 2024-02-17 LAB
DEPRECATED S PYO AG THROAT QL EIA: NEGATIVE
FLUAV RNA SPEC QL NAA+PROBE: NOT DETECTED
FLUBV RNA SPEC QL NAA+PROBE: NOT DETECTED
SARS-COV-2 RNA RESP QL NAA+PROBE: NOT DETECTED

## 2024-02-17 PROCEDURE — 99283 EMERGENCY DEPT VISIT LOW MDM: CPT

## 2024-02-17 PROCEDURE — 87636 SARSCOV2 & INF A&B AMP PRB: CPT

## 2024-02-17 PROCEDURE — 87070 CULTURE OTHR SPECIMN AEROBIC: CPT

## 2024-02-17 PROCEDURE — 87880 STREP A ASSAY W/OPTIC: CPT

## 2024-02-17 RX ORDER — CLINDAMYCIN HYDROCHLORIDE 300 MG/1
300 CAPSULE ORAL 3 TIMES DAILY
COMMUNITY
Start: 2024-02-05

## 2024-02-17 RX ORDER — CLOTRIMAZOLE AND BETAMETHASONE DIPROPIONATE 10; .64 MG/G; MG/G
CREAM TOPICAL
COMMUNITY
Start: 2024-01-26

## 2024-02-17 ASSESSMENT — PAIN SCALES - GENERAL: PAINLEVEL_OUTOF10: 0

## 2024-02-17 ASSESSMENT — PAIN - FUNCTIONAL ASSESSMENT: PAIN_FUNCTIONAL_ASSESSMENT: 0-10

## 2024-02-17 NOTE — ED TRIAGE NOTES
Pt states that she went to her doc and was dx with strep throat. She took all of the Abx meds she was Rx but still feels like it is still there. Pt is having congestion that she thinks is due to her allergies. She takes meds for them but they are not helping at this time. Pt states that she also has tinnitus and has an appt with an ENT but wants some help with the issue until then.

## 2024-02-17 NOTE — ED NOTES
Discharged home to caregiver.  Ambulatory out of ED.  VS WDL.  0 s/s acute distress.  Respirations even and unlabored.  Discharge instructions and follow up care reviewed.  Patient receptive and demonstrated knowledge of instruction via teach-back method.

## 2024-02-17 NOTE — ED PROVIDER NOTES
[facility]  EMERGENCY DEPARTMENT ENCOUNTER      Pt Name: Ruth Jeong  MRN: [id number]  Birthdate 1960  Date of evaluation: 2024  Provider: Dimitri Helm MD  6:50 AM    CHIEF COMPLAINT       Chief Complaint   Patient presents with    Pharyngitis    Nasal Congestion    Tinnitus         HISTORY OF PRESENT ILLNESS    Ruth Jeong is a 63 y.o. female who presents to the emergency department with report of tinnitus and that she was treated for Strep throat and feels like it is still there. He has appointment with ENT.      [facility]    Nursing Notes were reviewed.    REVIEW OF SYSTEMS       Review of Systems   Constitutional: Negative.    HENT: Negative.     Eyes: Negative.    Respiratory: Negative.     Cardiovascular: Negative.    Gastrointestinal: Negative.    Endocrine: Negative.    Genitourinary: Negative.    Musculoskeletal: Negative.    Allergic/Immunologic: Negative.    Neurological: Negative.    Hematological: Negative.    Psychiatric/Behavioral: Negative.         Except as noted above the remainder of the review of systems was reviewed and negative.       PAST MEDICAL HISTORY     Past Medical History:   Diagnosis Date    Allergies     Diabetes (HCC)     Hypercholesteremia     Hypertension     Strep throat          SURGICAL HISTORY       Past Surgical History:   Procedure Laterality Date    BACK SURGERY       SECTION      SKIN LESION EXCISION Right 1/15/2024    EXCISION OF EPIDERMOID CYST OF RIGHT UPPER ARM performed by Jemma Olea MD at [facility]         CURRENT MEDICATIONS       Previous Medications    ACARBOSE (PRECOSE) 100 MG TABLET    TAKE 1 TABLET BY MOUTH THREE TIMES DAILY WITH THE FIRST BITE OF EACH MAIN MEAL    AMLODIPINE (NORVASC) 5 MG TABLET    Take 1 tablet by mouth daily    ASPIRIN 81 MG EC TABLET    Take 1 tablet by mouth daily    ATORVASTATIN (LIPITOR) 80 MG TABLET    Take 1 tablet by mouth nightly at bedtime    AZELASTINE  MCG/SPRAY SOLN    USE 1 SPRAY(S) IN EACH  threatening deterioration in the patient's condition which required my urgent intervention.      CONSULTS:  None    PROCEDURES:  Unless otherwise noted below, none     Procedures        FINAL IMPRESSION    No diagnosis found.      DISPOSITION/PLAN   DISPOSITION        PATIENT REFERRED TO:  No follow-up provider specified.    DISCHARGE MEDICATIONS:  New Prescriptions    No medications on file     Controlled Substances Monitoring:          No data to display                (Please note that portions of this note were completed with a voice recognition program.  Efforts were made to edit the dictations but occasionally words are mis-transcribed.)    Brain MD Volodymyr (electronically signed)  Attending Emergency Physician           Tom Helm MD  02/17/24 0671

## 2024-02-18 LAB
BACTERIA SPEC CULT: NORMAL
Lab: NORMAL

## 2024-02-19 ENCOUNTER — TELEPHONE (OUTPATIENT)
Age: 64
End: 2024-02-19

## 2024-02-19 NOTE — TELEPHONE ENCOUNTER
Patient had LVM to call her back in reference to the nasal spray.    Called the patient and LVM to call the office back at her earliest convenience.

## 2024-02-26 ENCOUNTER — OFFICE VISIT (OUTPATIENT)
Age: 64
End: 2024-02-26
Payer: MEDICAID

## 2024-02-26 VITALS
DIASTOLIC BLOOD PRESSURE: 60 MMHG | BODY MASS INDEX: 30.32 KG/M2 | OXYGEN SATURATION: 98 % | RESPIRATION RATE: 16 BRPM | WEIGHT: 182 LBS | HEART RATE: 68 BPM | HEIGHT: 65 IN | SYSTOLIC BLOOD PRESSURE: 124 MMHG

## 2024-02-26 DIAGNOSIS — H93.13 TINNITUS AURIUM, BILATERAL: Primary | ICD-10-CM

## 2024-02-26 DIAGNOSIS — J30.9 ALLERGIC RHINITIS, UNSPECIFIED SEASONALITY, UNSPECIFIED TRIGGER: ICD-10-CM

## 2024-02-26 DIAGNOSIS — H60.8X3 CHRONIC ECZEMATOUS OTITIS EXTERNA OF BOTH EARS: ICD-10-CM

## 2024-02-26 PROCEDURE — 99214 OFFICE O/P EST MOD 30 MIN: CPT | Performed by: OTOLARYNGOLOGY

## 2024-02-26 NOTE — PROGRESS NOTES
lb)   SpO2 98%   BMI 30.29 kg/m²       General: Comfortable, pleasant, appears stated age  Voice: Strong, speaking in full sentences, no stridor    Face: No masses or lesions, facial strength symmetric   Ears: External ears unremarkable. Bilateral ear canal clear. Tympanic membrane clear and intact, with visible landmarks. Clear middle ear space  Nose: External nose unremarkable. Dorsum midline. Anterior rhinoscopy demonstrates no lesions. Septum midline. Turbinates without hypertrophy.  Oral Cavity / Oropharynx: No trismus. Mucosa pink and moist. No lesions. Tongue is midline and mobile. Palate elevates symmetrically. Uvula midline. Tonsils unremarkable. Base of tongue soft. Floor of mouth soft.   Neck: Supple. No adenopathy. Thyroid unremarkable. Palpable laryngeal landmarks. Full neck range of motion   Neurologic: CN II - XI intact. Normal gait      Assessment and Plan:   Bilateral tinnitus  -Discussed that ultimately if this persists we probably should do an audiogram  -She feels her hearing is good  -Discussed there is sometimes an association with hearing loss and tinnitus and so we can evaluate this at her convenience    2.  Bilateral ear canal dermatitis  -Derm otic oil drops are helpful.  Discussed proper use.  Use every few weeks as needed    3.  Allergic rhinitis  -She is already using Flonase and Astelin  -She is on daily loratadine  -She did not tolerate Singulair  -If she feels like symptoms are persistent despite we discussed arrange allergy testing and she would like to proceed      The patient was instructed to return to clinic if no improvement or progression of symptoms. Signs to watch out for reviewed.      Johana Hoffman MD   [facility]  [address] [place] [address] [address]  [place] [address]  Office Phone: [phone]

## 2024-02-28 ENCOUNTER — TELEPHONE (OUTPATIENT)
Age: 64
End: 2024-02-28

## 2024-02-28 NOTE — TELEPHONE ENCOUNTER
SAIMA for pt to stop all antihistamines prior to allergy test on 03/05/24. Instructed to call office any questions. Archie

## 2024-03-19 ENCOUNTER — OFFICE VISIT (OUTPATIENT)
Age: 64
End: 2024-03-19
Payer: MEDICAID

## 2024-03-19 VITALS — DIASTOLIC BLOOD PRESSURE: 78 MMHG | OXYGEN SATURATION: 98 % | SYSTOLIC BLOOD PRESSURE: 140 MMHG | HEART RATE: 65 BPM

## 2024-03-19 DIAGNOSIS — J30.9 ALLERGIC RHINITIS, UNSPECIFIED SEASONALITY, UNSPECIFIED TRIGGER: Primary | ICD-10-CM

## 2024-03-19 DIAGNOSIS — H60.8X3 CHRONIC ECZEMATOUS OTITIS EXTERNA OF BOTH EARS: ICD-10-CM

## 2024-03-19 DIAGNOSIS — J30.9 ALLERGIC RHINITIS, UNSPECIFIED SEASONALITY, UNSPECIFIED TRIGGER: ICD-10-CM

## 2024-03-19 DIAGNOSIS — R09.82 POSTNASAL DRIP: ICD-10-CM

## 2024-03-19 DIAGNOSIS — H93.13 TINNITUS, BILATERAL: ICD-10-CM

## 2024-03-19 PROCEDURE — 99214 OFFICE O/P EST MOD 30 MIN: CPT | Performed by: OTOLARYNGOLOGY

## 2024-03-19 RX ORDER — AZELASTINE HYDROCHLORIDE 137 UG/1
SPRAY, METERED NASAL
Qty: 1 EACH | Refills: 0 | Status: SHIPPED | OUTPATIENT
Start: 2024-03-19

## 2024-03-19 RX ORDER — FEXOFENADINE HCL 180 MG/1
180 TABLET ORAL DAILY
Qty: 90 TABLET | Refills: 1 | Status: SHIPPED | OUTPATIENT
Start: 2024-03-19

## 2024-03-19 NOTE — PROGRESS NOTES
Otolaryngology-Head and Neck Surgery  Follow Up Patient Visit     Patient: Ruth Jeong  YOB: 1960  MRN: [id number]  Date of Service:  3/19/2024    Chief Complaint:   Chief Complaint   Patient presents with    Follow-up     Allergies,congestion     Interval hx 3/19/2024  Was scheduled for allergy testing but was unable to stop antihistamines    History of Present Illness: Ruth Jenog is a 63 y.o. female who was last seen by Dr. Buckley for tinnitus, allergies and ear canal dermatitis    In the last few weeks she has developed what she feels is an allergy exacerbation  Has nasal congestion, postnasal drip, sore throat    Was seen by her primary care doctor and given a prescription for clindamycin that she was diagnosed with strep throat    She was also given a prescription for Singulair for her allergies but she was not able to tolerate this as it made her feel poorly    As far as allergies go she takes Flonase and Astelin which she finds are helpful. She also takes daily loratadine    With worsening allergy symptoms she feels her tinnitus is worse as well    No recent audiogram  Feels her hearing is good    Past Medical History:  Past Medical History:   Diagnosis Date    Allergies     Diabetes (HCC)     Hypercholesteremia     Hypertension     Strep throat        Past Surgical History:   Past Surgical History:   Procedure Laterality Date    BACK SURGERY       SECTION      SKIN LESION EXCISION Right 1/15/2024    EXCISION OF EPIDERMOID CYST OF RIGHT UPPER ARM performed by Jemma Olea MD at [facility] MAIN OR       Medications:   Current Outpatient Medications   Medication Instructions    acarbose (PRECOSE) 100 MG tablet TAKE 1 TABLET BY MOUTH THREE TIMES DAILY WITH THE FIRST BITE OF EACH MAIN MEAL    amLODIPine (NORVASC) 5 mg, Oral, DAILY    aspirin 81 mg, Oral, DAILY    atorvastatin (LIPITOR) 80 mg, Oral, NIGHTLY, at bedtime    Azelastine HCl 137 MCG/SPRAY SOLN USE 1 SPRAY(S) IN EACH NOSTRIL ONCE

## 2024-04-28 ENCOUNTER — HOSPITAL ENCOUNTER (EMERGENCY)
Facility: HOSPITAL | Age: 64
Discharge: HOME | End: 2024-04-28
Attending: STUDENT IN AN ORGANIZED HEALTH CARE EDUCATION/TRAINING PROGRAM
Payer: MEDICARE

## 2024-04-28 VITALS
RESPIRATION RATE: 16 BRPM | OXYGEN SATURATION: 98 % | DIASTOLIC BLOOD PRESSURE: 84 MMHG | TEMPERATURE: 98.6 F | HEART RATE: 77 BPM | SYSTOLIC BLOOD PRESSURE: 153 MMHG

## 2024-04-28 DIAGNOSIS — J02.9 SORE THROAT: ICD-10-CM

## 2024-04-28 DIAGNOSIS — J30.2 SEASONAL ALLERGIES: Primary | ICD-10-CM

## 2024-04-28 LAB — DEPRECATED S PYO AG THROAT QL EIA: NEGATIVE

## 2024-04-28 PROCEDURE — 87070 CULTURE OTHR SPECIMN AEROBIC: CPT

## 2024-04-28 PROCEDURE — 99283 EMERGENCY DEPT VISIT LOW MDM: CPT

## 2024-04-28 PROCEDURE — 87880 STREP A ASSAY W/OPTIC: CPT

## 2024-04-28 RX ORDER — LIDOCAINE HYDROCHLORIDE 20 MG/ML
15 SOLUTION OROPHARYNGEAL PRN
Qty: 100 ML | Refills: 0 | Status: SHIPPED | OUTPATIENT
Start: 2024-04-28 | End: 2024-05-08

## 2024-04-28 ASSESSMENT — PAIN - FUNCTIONAL ASSESSMENT
PAIN_FUNCTIONAL_ASSESSMENT: 0-10
PAIN_FUNCTIONAL_ASSESSMENT: 0-10

## 2024-04-28 ASSESSMENT — PAIN DESCRIPTION - LOCATION: LOCATION: THROAT

## 2024-04-28 ASSESSMENT — PAIN DESCRIPTION - PAIN TYPE: TYPE: ACUTE PAIN

## 2024-04-28 ASSESSMENT — PAIN SCALES - GENERAL
PAINLEVEL_OUTOF10: 1
PAINLEVEL_OUTOF10: 10

## 2024-04-28 NOTE — ED PROVIDER NOTES
[facility] EMERGENCY DEPT  EMERGENCY DEPARTMENT HISTORY AND PHYSICAL EXAM      Date: 2024  Patient Name: Ruth Jeong  MRN: [id number]  YOB: 1960  Date of evaluation: 2024  Provider: You Slater MD   Note Started: 11:38 AM EDT 24    HISTORY OF PRESENT ILLNESS     Chief Complaint   Patient presents with    Sore Throat       History Provided By: Patient    HPI: Ruth Jeong is a 63 y.o. female history including diabetes, hypertension, seasonal allergies, presents with sore throat.  Patient says for the last 3 days she has felt worse seasonal allergy symptoms.  Yesterday, she was cleaning her house when she was exposed to a significant amount of dust.  She says ever since then, she has had a \"scratchy\" and sore throat.  She has taken her allergy medication but says the symptoms have persisted.  No cough, fevers, dyspnea.    PAST MEDICAL HISTORY   Past Medical History:  Past Medical History:   Diagnosis Date    Allergies     Diabetes (HCC)     Hypercholesteremia     Hypertension     Strep throat        Past Surgical History:  Past Surgical History:   Procedure Laterality Date    BACK SURGERY       SECTION      SKIN LESION EXCISION Right 1/15/2024    EXCISION OF EPIDERMOID CYST OF RIGHT UPPER ARM performed by Jemma Olea MD at [facility] MAIN OR       Family History:  Family History   Problem Relation Age of Onset    Diabetes Mother     High Blood Pressure Mother     No Known Problems Brother        Social History:  Social History     Tobacco Use    Smoking status: Former     Types: Cigarettes    Smokeless tobacco: Never   Vaping Use    Vaping Use: Never used   Substance Use Topics    Alcohol use: Not Currently    Drug use: Never       Allergies:  Allergies   Allergen Reactions    Amoxicillin Hives    Penicillins Hives    Lisinopril Swelling    Montelukast     Nsaids      Other reaction(s): Unable to Obtain       PCP: Christophe Graff Sr., MD    Current Meds:   No current

## 2024-04-28 NOTE — DISCHARGE INSTRUCTIONS
Thank you!  Thank you for allowing me to care for you in the emergency department. It is my goal to provide you with excellent care.  Please fill out the survey that will come to you by mail or email since we listen to your feedback!     Below you will find a list of your tests from today's visit.  Should you have any questions, please do not hesitate to call the emergency department.    Labs  Recent Results (from the past 12 hour(s))   Rapid Strep Screen    Collection Time: 04/28/24 11:16 AM    Specimen: Blood Serum; Throat   Result Value Ref Range    Strep A Ag Negative Negative         Radiologic Studies  No orders to display     ------------------------------------------------------------------------------------------------------------  The exam and treatment you received in the Emergency Department were for an urgent problem and are not intended as complete care. It is important that you follow-up with a doctor, nurse practitioner, or physician assistant to:  (1) confirm your diagnosis,  (2) re-evaluation of changes in your illness and treatment, and (3) for ongoing care. Please take your discharge instructions with you when you go to your follow-up appointment.     If you have any problem arranging a follow-up appointment, contact the Emergency Department.  If your symptoms become worse or you do not improve as expected and you are unable to reach your health care provider, please return to the Emergency Department. We are available 24 hours a day.     If a prescription has been provided, please have it filled as soon as possible to prevent a delay in treatment. If you have any questions or reservations about taking the medication due to side effects or interactions with other medications, please call your primary care provider or contact the ER.

## 2024-04-29 ENCOUNTER — TELEPHONE (OUTPATIENT)
Age: 64
End: 2024-04-29

## 2024-04-29 LAB
BACTERIA SPEC CULT: NORMAL
Lab: NORMAL

## 2024-04-29 NOTE — TELEPHONE ENCOUNTER
Pt called stating she missed her appt today due to transportation issues. I informed pt that next available would be in June, she stated she can't wait that long due to her throat issues and having to go to the ER yesterday. She would like a phone call with you to discuss her issues.

## 2024-05-14 ENCOUNTER — OFFICE VISIT (OUTPATIENT)
Age: 64
End: 2024-05-14
Payer: MEDICARE

## 2024-05-14 VITALS
WEIGHT: 182 LBS | HEART RATE: 77 BPM | HEIGHT: 65 IN | RESPIRATION RATE: 17 BRPM | DIASTOLIC BLOOD PRESSURE: 78 MMHG | BODY MASS INDEX: 30.32 KG/M2 | SYSTOLIC BLOOD PRESSURE: 120 MMHG | OXYGEN SATURATION: 97 %

## 2024-05-14 DIAGNOSIS — H93.13 TINNITUS AURIUM, BILATERAL: Primary | ICD-10-CM

## 2024-05-14 DIAGNOSIS — R09.82 POSTNASAL DRIP: ICD-10-CM

## 2024-05-14 DIAGNOSIS — J02.9 SORE THROAT: ICD-10-CM

## 2024-05-14 DIAGNOSIS — K21.9 LARYNGOPHARYNGEAL REFLUX (LPR): ICD-10-CM

## 2024-05-14 PROCEDURE — 99214 OFFICE O/P EST MOD 30 MIN: CPT | Performed by: OTOLARYNGOLOGY

## 2024-05-14 RX ORDER — OMEPRAZOLE 40 MG/1
40 CAPSULE, DELAYED RELEASE ORAL
Qty: 90 CAPSULE | Refills: 1 | Status: SHIPPED | OUTPATIENT
Start: 2024-05-14

## 2024-05-14 NOTE — PROGRESS NOTES
Otolaryngology-Head and Neck Surgery  Follow Up Patient Visit     Patient: Ruth Jeong  YOB: 1960  MRN: [id number]  Date of Service:  2024    Chief Complaint:   Chief Complaint   Patient presents with    Follow-up     Allergic rhinitis, unspecified seasonality, unspecified trigger     Interval hx 2024  Developed sore throat, left sided a few weeks ago  Seen in ER    Interval hx 3/19/2024  Was scheduled for allergy testing but was unable to stop antihistamines    History of Present Illness: Ruth Jeong is a 64 y.o. female who was last seen by Dr. Buckley for tinnitus, allergies and ear canal dermatitis    In the last few weeks she has developed what she feels is an allergy exacerbation  Has nasal congestion, postnasal drip, sore throat    Was seen by her primary care doctor and given a prescription for clindamycin that she was diagnosed with strep throat    She was also given a prescription for Singulair for her allergies but she was not able to tolerate this as it made her feel poorly    As far as allergies go she takes Flonase and Astelin which she finds are helpful. She also takes daily loratadine    With worsening allergy symptoms she feels her tinnitus is worse as well    No recent audiogram  Feels her hearing is good    Past Medical History:  Past Medical History:   Diagnosis Date    Allergies     Diabetes (HCC)     Hypercholesteremia     Hypertension     Strep throat        Past Surgical History:   Past Surgical History:   Procedure Laterality Date    BACK SURGERY       SECTION      SKIN LESION EXCISION Right 1/15/2024    EXCISION OF EPIDERMOID CYST OF RIGHT UPPER ARM performed by Jemma Olea MD at [facility] MAIN OR       Medications:   Current Outpatient Medications   Medication Instructions    acarbose (PRECOSE) 100 MG tablet TAKE 1 TABLET BY MOUTH THREE TIMES DAILY WITH THE FIRST BITE OF EACH MAIN MEAL    amLODIPine (NORVASC) 5 mg, Oral, DAILY    aspirin 81 mg, Oral, DAILY

## 2024-05-29 RX ORDER — AZELASTINE HYDROCHLORIDE 137 UG/1
SPRAY, METERED NASAL
Qty: 30 ML | Refills: 0 | Status: SHIPPED | OUTPATIENT
Start: 2024-05-29

## 2024-06-11 ENCOUNTER — OFFICE VISIT (OUTPATIENT)
Age: 64
End: 2024-06-11

## 2024-06-11 VITALS
WEIGHT: 170 LBS | BODY MASS INDEX: 28.32 KG/M2 | HEIGHT: 65 IN | SYSTOLIC BLOOD PRESSURE: 126 MMHG | RESPIRATION RATE: 16 BRPM | DIASTOLIC BLOOD PRESSURE: 68 MMHG | HEART RATE: 64 BPM | OXYGEN SATURATION: 98 %

## 2024-06-11 DIAGNOSIS — R09.82 POSTNASAL DRIP: ICD-10-CM

## 2024-06-11 DIAGNOSIS — K21.9 LARYNGOPHARYNGEAL REFLUX (LPR): Primary | ICD-10-CM

## 2024-06-11 DIAGNOSIS — H93.13 TINNITUS AURIUM, BILATERAL: ICD-10-CM

## 2024-06-11 DIAGNOSIS — J02.9 SORE THROAT: ICD-10-CM

## 2024-06-11 NOTE — PROGRESS NOTES
regurgitation  Musculoskeletal: denies muscle weakness or wasting  Cardiovascular: denies chest pain, shortness of breath  Neurologic: denies seizures, numbness or tingling, syncope  Hematologic: denies easy bleeding or bruising    Physical Examination:   /68   Pulse 64   Resp 16   Ht 1.651 m (5' 5\")   Wt 77.1 kg (170 lb)   SpO2 98%   BMI 28.29 kg/m²       General: Comfortable, pleasant, appears stated age  Voice: Strong, speaking in full sentences, no stridor    Face: No masses or lesions, facial strength symmetric   Ears: External ears unremarkable. Bilateral ear canal clear. Tympanic membrane clear and intact, with visible landmarks. Clear middle ear space  Nose: External nose unremarkable. Dorsum midline. Anterior rhinoscopy demonstrates no lesions. Septum midline. Turbinates without hypertrophy.  Oral Cavity / Oropharynx: No trismus. Mucosa pink and moist. Upper partial removed. No lesions. Tongue is midline and mobile. Palate elevates symmetrically. Uvula midline. Tonsils unremarkable. Base of tongue soft. Floor of mouth soft.   Neck: Supple. No adenopathy. Thyroid unremarkable. Palpable laryngeal landmarks. Full neck range of motion   Neurologic: CN II - XI intact. Normal gait      Assessment and Plan:   Bilateral tinnitus  -Discussed that ultimately if this persists we probably should do an audiogram  -She feels her hearing is good  -Discussed there is sometimes an association with hearing loss and tinnitus and so we can evaluate this at her convenience    2.  Bilateral ear canal dermatitis  -Derm otic oil drops are helpful.  Discussed proper use.  Use every few weeks as needed    3.  Non allergic rhinitis  4. Sore throat  5. Post nasal drip  - Has sig nasal congestion, rhinitis and now sore throat  - We discussed she had blood work by her PCP showing allergy testing benign  - She has already tried nasal sprays, antihistamines and has not tolerated singulair  - Will continue allegra for now  -

## 2024-06-12 ENCOUNTER — TELEPHONE (OUTPATIENT)
Age: 64
End: 2024-06-12

## 2024-06-12 NOTE — TELEPHONE ENCOUNTER
Patient states that she is using the cream for the ear itching.  Ear started to hurt today and wants to know what she should do?  Please advise..

## 2024-06-14 RX ORDER — AZELASTINE HYDROCHLORIDE 137 UG/1
SPRAY, METERED NASAL
Qty: 30 ML | Refills: 0 | OUTPATIENT
Start: 2024-06-14

## 2024-06-20 NOTE — PROGRESS NOTES
This note/encounter/order is being administratively closed in accordance with the defined policies, procedures and workflows established by [facility]. I cannot validate the clinical content accuracy of this information.

## 2024-07-03 RX ORDER — AZELASTINE HYDROCHLORIDE 137 UG/1
SPRAY, METERED NASAL
Qty: 30 ML | Refills: 0 | Status: SHIPPED | OUTPATIENT
Start: 2024-07-03

## 2024-07-09 ENCOUNTER — TELEPHONE (OUTPATIENT)
Age: 64
End: 2024-07-09

## 2024-07-09 DIAGNOSIS — H60.8X3 CHRONIC ECZEMATOUS OTITIS EXTERNA OF BOTH EARS: Primary | ICD-10-CM

## 2024-07-09 RX ORDER — FLUOCINOLONE ACETONIDE 0.11 MG/ML
OIL AURICULAR (OTIC)
Qty: 20 ML | Refills: 3 | Status: SHIPPED | OUTPATIENT
Start: 2024-07-09

## 2024-07-09 NOTE — TELEPHONE ENCOUNTER
Patient called asking if she can use fluocinolone (DERMOTIC) 0.01 % OIL oil for ear itching. The instructions say PRN for itching, so I told her yes, since it wasn't the cream. Please advise. If she can, she would like a refill of them please.

## 2024-07-11 DIAGNOSIS — E66.3 OVERWEIGHT: ICD-10-CM

## 2024-07-11 DIAGNOSIS — M25.541 ARTHRALGIA OF BOTH HANDS: ICD-10-CM

## 2024-07-11 DIAGNOSIS — R14.0 BLOATING: Primary | ICD-10-CM

## 2024-07-11 DIAGNOSIS — M25.542 ARTHRALGIA OF BOTH HANDS: ICD-10-CM

## 2024-07-11 DIAGNOSIS — L29.2 PRURITUS VULVAE: ICD-10-CM

## 2024-07-11 DIAGNOSIS — H93.13 BILATERAL TINNITUS: ICD-10-CM

## 2024-07-11 DIAGNOSIS — K76.0 FATTY LIVER: ICD-10-CM

## 2024-07-11 DIAGNOSIS — K21.9 LARYNGOPHARYNGEAL REFLUX: ICD-10-CM

## 2024-07-11 PROBLEM — K59.00 CONSTIPATION: Status: ACTIVE | Noted: 2024-07-11

## 2024-07-11 PROBLEM — F41.9 ANXIETY DISORDER: Status: ACTIVE | Noted: 2024-07-11

## 2024-07-11 PROBLEM — E78.5 HYPERLIPIDEMIA: Status: ACTIVE | Noted: 2024-07-11

## 2024-07-11 PROBLEM — I10 HYPERTENSION: Status: ACTIVE | Noted: 2024-07-11

## 2024-07-11 PROBLEM — J30.9 ALLERGIC RHINITIS: Status: ACTIVE | Noted: 2024-07-11

## 2024-08-02 ENCOUNTER — TELEPHONE (OUTPATIENT)
Age: 64
End: 2024-08-02

## 2024-08-02 NOTE — TELEPHONE ENCOUNTER
Received call from pt states she has L ear pain,fluid in ear and her allergies are acting up. Advised her to take Tylenol and her Allegra. She has an appt on 08/13/24. Please advise , states it is OK to leave message. Archie

## 2024-08-13 ENCOUNTER — OFFICE VISIT (OUTPATIENT)
Age: 64
End: 2024-08-13
Payer: MEDICARE

## 2024-08-13 VITALS
OXYGEN SATURATION: 99 % | BODY MASS INDEX: 27.99 KG/M2 | WEIGHT: 168 LBS | HEART RATE: 62 BPM | HEIGHT: 65 IN | RESPIRATION RATE: 16 BRPM | DIASTOLIC BLOOD PRESSURE: 60 MMHG | SYSTOLIC BLOOD PRESSURE: 122 MMHG

## 2024-08-13 DIAGNOSIS — H92.02 LEFT EAR PAIN: ICD-10-CM

## 2024-08-13 DIAGNOSIS — H93.13 TINNITUS AURIUM, BILATERAL: ICD-10-CM

## 2024-08-13 DIAGNOSIS — H60.8X3 CHRONIC ECZEMATOUS OTITIS EXTERNA OF BOTH EARS: ICD-10-CM

## 2024-08-13 DIAGNOSIS — K21.9 LARYNGOPHARYNGEAL REFLUX (LPR): Primary | ICD-10-CM

## 2024-08-13 PROCEDURE — 99214 OFFICE O/P EST MOD 30 MIN: CPT | Performed by: OTOLARYNGOLOGY

## 2024-08-13 PROCEDURE — 3078F DIAST BP <80 MM HG: CPT | Performed by: OTOLARYNGOLOGY

## 2024-08-13 PROCEDURE — 3074F SYST BP LT 130 MM HG: CPT | Performed by: OTOLARYNGOLOGY

## 2024-08-13 RX ORDER — OMEPRAZOLE 20 MG/1
20 CAPSULE, DELAYED RELEASE ORAL
Qty: 90 CAPSULE | Refills: 1 | Status: SHIPPED | OUTPATIENT
Start: 2024-08-13

## 2024-08-13 NOTE — PROGRESS NOTES
Otolaryngology-Head and Neck Surgery  Follow Up Patient Visit     Patient: Ruth Jeong  YOB: 1960  MRN: [id number]  Date of Service:  8/13/2024    Chief Complaint:   Chief Complaint   Patient presents with    Follow-up     Laryngopharyngeal reflux (LPR)    Ear Problem    Tinnitus     Interval hx 8/13/2024  Throat has been doing better consistently for a few months now  Left ear bothersome  Cont to have itching  Cont to have tinnitus    Interval hx 6/2024  Father just passed away on Saturday  Otherwise has been doing better since addition of PPI     Interval hx 5/2024  Developed sore throat, left sided a few weeks ago  Seen in ER    Interval hx 3/19/2024  Was scheduled for allergy testing but was unable to stop antihistamines    History of Present Illness: Ruth Jeong is a 64 y.o. female who was last seen by Dr. Buckley for tinnitus, allergies and ear canal dermatitis    In the last few weeks she has developed what she feels is an allergy exacerbation  Has nasal congestion, postnasal drip, sore throat    Was seen by her primary care doctor and given a prescription for clindamycin that she was diagnosed with strep throat    She was also given a prescription for Singulair for her allergies but she was not able to tolerate this as it made her feel poorly    As far as allergies go she takes Flonase and Astelin which she finds are helpful. She also takes daily loratadine    With worsening allergy symptoms she feels her tinnitus is worse as well    No recent audiogram  Feels her hearing is good    Past Medical History:  Past Medical History:   Diagnosis Date    Allergic rhinitis     Allergies     Anxiety disorder     Arthralgia of both hands     Bilateral tinnitus     Bloating     Constipation     Diabetes (HCC)     Fatty liver     GERD (gastroesophageal reflux disease)     Hypercholesteremia     Hyperlipidemia     Hypertension     Hypertension     Laryngopharyngeal reflux     Overweight     Pruritus vulvae     Strep

## 2024-08-30 ENCOUNTER — TELEPHONE (OUTPATIENT)
Age: 64
End: 2024-08-30

## 2024-08-30 NOTE — TELEPHONE ENCOUNTER
Called patient to reschedule hearing test. Patient stated that she'll like to cancel until her next follow up with dr. Hoffman and will gives us a new time and date then.

## 2024-09-16 RX ORDER — FEXOFENADINE HCL 180 MG/1
180 TABLET ORAL DAILY
Qty: 90 TABLET | Refills: 1 | Status: SHIPPED | OUTPATIENT
Start: 2024-09-16

## 2024-10-10 ENCOUNTER — OFFICE VISIT (OUTPATIENT)
Age: 64
End: 2024-10-10
Payer: MEDICARE

## 2024-10-10 VITALS
TEMPERATURE: 97.7 F | RESPIRATION RATE: 18 BRPM | OXYGEN SATURATION: 98 % | BODY MASS INDEX: 26.68 KG/M2 | SYSTOLIC BLOOD PRESSURE: 132 MMHG | DIASTOLIC BLOOD PRESSURE: 75 MMHG | HEIGHT: 66 IN | HEART RATE: 68 BPM | WEIGHT: 166 LBS

## 2024-10-10 DIAGNOSIS — K59.09 CHRONIC CONSTIPATION: Primary | ICD-10-CM

## 2024-10-10 DIAGNOSIS — R14.0 BLOATING: ICD-10-CM

## 2024-10-10 DIAGNOSIS — R10.30 LOWER ABDOMINAL PAIN: ICD-10-CM

## 2024-10-10 PROCEDURE — 3078F DIAST BP <80 MM HG: CPT | Performed by: INTERNAL MEDICINE

## 2024-10-10 PROCEDURE — 3075F SYST BP GE 130 - 139MM HG: CPT | Performed by: INTERNAL MEDICINE

## 2024-10-10 PROCEDURE — 99204 OFFICE O/P NEW MOD 45 MIN: CPT | Performed by: INTERNAL MEDICINE

## 2024-10-10 RX ORDER — POLYETHYLENE GLYCOL 3350 17 G/17G
POWDER, FOR SOLUTION ORAL
Qty: 510 G | Refills: 0 | Status: SHIPPED | OUTPATIENT
Start: 2024-10-10

## 2024-10-10 ASSESSMENT — ENCOUNTER SYMPTOMS
VOMITING: 0
BLOOD IN STOOL: 0
CONSTIPATION: 1
DIARRHEA: 0
ABDOMINAL PAIN: 1
ABDOMINAL DISTENTION: 1
RECTAL PAIN: 0
ALLERGIC/IMMUNOLOGIC NEGATIVE: 1
RESPIRATORY NEGATIVE: 1
ANAL BLEEDING: 0
NAUSEA: 0

## 2024-10-11 NOTE — PROGRESS NOTES
Chief Complaint   Patient presents with    Gastroesophageal Reflux    Abdominal Pain    Bloated     \"Have you been to the ER, urgent care clinic since your last visit?  Hospitalized since your last visit?\"    NO    “Have you seen or consulted any other health care providers outside our system since your last visit?”    NO    Have you had a mammogram?”   NO    No breast cancer screening on file      “Have you had a pap smear?”    NO    No cervical cancer screening on file       “Have you had a colorectal cancer screening such as a colonoscopy/FIT/Cologuard?    NO    No colonoscopy on file  No cologuard on file  No FIT/FOBT on file   No flexible sigmoidoscopy on file           
do move but she has a lot of bloating associated with it.  She has been losing some weight she does take omeprazole 20 mg daily now but states it worked much better when she was on a higher dose.  She states she has been on Dulcolax 2 times a month.  She states she had a colonoscopy probably greater than 10 years ago and had been refusing a repeat test.  She states she will consider it if symptoms persist.    Gastroesophageal Reflux  She complains of abdominal pain. She reports no nausea.   Abdominal Pain  Associated symptoms include arthralgias and constipation. Pertinent negatives include no diarrhea, nausea or vomiting. Her past medical history is significant for GERD.   Bloated  Associated symptoms include abdominal pain and arthralgias. Pertinent negatives include no nausea or vomiting.         Review of Systems   Constitutional: Negative.    HENT:  Negative for nosebleeds.    Respiratory: Negative.     Cardiovascular: Negative.    Gastrointestinal:  Positive for abdominal distention, abdominal pain and constipation. Negative for anal bleeding, blood in stool, diarrhea, nausea, rectal pain and vomiting.   Genitourinary: Negative.    Musculoskeletal:  Positive for arthralgias.   Skin: Negative.    Allergic/Immunologic: Negative.    Neurological: Negative.    Hematological: Negative.    Psychiatric/Behavioral: Negative.     All other systems reviewed and are negative.          /75 (Site: Left Upper Arm, Position: Sitting, Cuff Size: Medium Adult)   Pulse 68   Temp 97.7 °F (36.5 °C) (Temporal)   Resp 18   Ht 1.676 m (5' 6\")   Wt 75.3 kg (166 lb)   SpO2 98%   BMI 26.79 kg/m²     Physical Exam  Vitals and nursing note reviewed.   Constitutional:       Appearance: Normal appearance. She is obese.   HENT:      Head: Normocephalic and atraumatic.      Nose: Nose normal.   Eyes:      General: No scleral icterus.  Cardiovascular:      Rate and Rhythm: Normal rate and regular rhythm.      Pulses: Normal

## 2024-12-10 ENCOUNTER — OFFICE VISIT (OUTPATIENT)
Age: 64
End: 2024-12-10
Payer: MEDICARE

## 2024-12-10 VITALS
OXYGEN SATURATION: 99 % | HEART RATE: 59 BPM | HEIGHT: 66 IN | BODY MASS INDEX: 26.79 KG/M2 | DIASTOLIC BLOOD PRESSURE: 62 MMHG | SYSTOLIC BLOOD PRESSURE: 108 MMHG

## 2024-12-10 DIAGNOSIS — J31.0 NON-ALLERGIC RHINITIS: ICD-10-CM

## 2024-12-10 DIAGNOSIS — L29.9 EAR ITCH: ICD-10-CM

## 2024-12-10 DIAGNOSIS — K21.9 LARYNGOPHARYNGEAL REFLUX (LPR): ICD-10-CM

## 2024-12-10 DIAGNOSIS — H93.13 BILATERAL TINNITUS: ICD-10-CM

## 2024-12-10 DIAGNOSIS — R09.81 NASAL CONGESTION: Primary | ICD-10-CM

## 2024-12-10 PROCEDURE — 3074F SYST BP LT 130 MM HG: CPT | Performed by: OTOLARYNGOLOGY

## 2024-12-10 PROCEDURE — 99214 OFFICE O/P EST MOD 30 MIN: CPT | Performed by: OTOLARYNGOLOGY

## 2024-12-10 PROCEDURE — 3078F DIAST BP <80 MM HG: CPT | Performed by: OTOLARYNGOLOGY

## 2024-12-10 RX ORDER — FLUOCINOLONE ACETONIDE 0.11 MG/ML
OIL AURICULAR (OTIC)
COMMUNITY
Start: 2024-11-01

## 2024-12-10 RX ORDER — ECHINACEA PURPUREA EXTRACT 125 MG
1 TABLET ORAL PRN
Qty: 1 EACH | Refills: 3 | Status: SHIPPED | OUTPATIENT
Start: 2024-12-10

## 2024-12-10 NOTE — PROGRESS NOTES
Otolaryngology-Head and Neck Surgery  Follow Up Patient Visit     Patient: Ruth Jeong  YOB: 1960  MRN: [id number]  Date of Service:  12/10/2024    Chief Complaint:   Chief Complaint   Patient presents with    Follow-up     Laryngopharyngeal reflux (LPR     Interval hx 12/10/2024  Throat has been doing better consistently for a few months now  Ears itch, she admits not using dermotic but thinks it does probably help    Describes \" shocking\" feeling of her head  Thinks triggers by allergies  Wonders if related to electrolyte abnormalities    Interval hx 8/2024  Throat has been doing better consistently for a few months now  Left ear bothersome  Cont to have itching  Cont to have tinnitus    Interval hx 6/2024  Father just passed away on Saturday  Otherwise has been doing better since addition of PPI     Interval hx 5/2024  Developed sore throat, left sided a few weeks ago  Seen in ER    Interval hx 3/19/2024  Was scheduled for allergy testing but was unable to stop antihistamines    History of Present Illness: Ruth Jeong is a 64 y.o. female who was last seen by Dr. Buckley for tinnitus, allergies and ear canal dermatitis    In the last few weeks she has developed what she feels is an allergy exacerbation  Has nasal congestion, postnasal drip, sore throat    Was seen by her primary care doctor and given a prescription for clindamycin that she was diagnosed with strep throat    She was also given a prescription for Singulair for her allergies but she was not able to tolerate this as it made her feel poorly    As far as allergies go she takes Flonase and Astelin which she finds are helpful. She also takes daily loratadine    With worsening allergy symptoms she feels her tinnitus is worse as well    No recent audiogram  Feels her hearing is good    Past Medical History:  Past Medical History:   Diagnosis Date    Allergic rhinitis     Allergies     Anxiety disorder     Arthralgia of both hands     Bilateral

## 2024-12-18 ENCOUNTER — TELEPHONE (OUTPATIENT)
Age: 64
End: 2024-12-18

## 2024-12-18 NOTE — TELEPHONE ENCOUNTER
Patient LVM stating that she was having some issues with her medications.    Called and LVM for the patient to return the call regarding medications and some issues that she is having.

## 2024-12-19 NOTE — PROGRESS NOTES
Chart review completed all necessary documentation appears to be present.  Identified patient with two patient identifiers (name and ). Reviewed chart in preparation for visit and have obtained necessary documentation.    Ruth Jeong is a 64 y.o. female  Chief Complaint   Patient presents with    Follow-up     Epidermoid cyst excision     /66 (Site: Left Upper Arm, Position: Sitting, Cuff Size: Large Adult)   Pulse 58   Temp 98.2 °F (36.8 °C) (Oral)   Resp 18   Ht 1.676 m (5' 6\")   Wt 75.4 kg (166 lb 3.2 oz)   SpO2 95%   BMI 26.83 kg/m²     1. Have you been to the ER, urgent care clinic since your last visit?  Hospitalized since your last visit?no    2. Have you seen or consulted any other health care providers outside of the [facility] since your last visit?  Include any pap smears or colon screening. no

## 2024-12-23 ENCOUNTER — OFFICE VISIT (OUTPATIENT)
Age: 64
End: 2024-12-23
Payer: MEDICARE

## 2024-12-23 VITALS
TEMPERATURE: 98.2 F | DIASTOLIC BLOOD PRESSURE: 66 MMHG | OXYGEN SATURATION: 95 % | SYSTOLIC BLOOD PRESSURE: 124 MMHG | RESPIRATION RATE: 18 BRPM | HEIGHT: 66 IN | BODY MASS INDEX: 26.71 KG/M2 | WEIGHT: 166.2 LBS | HEART RATE: 58 BPM

## 2024-12-23 DIAGNOSIS — L91.0 KELOID: Primary | ICD-10-CM

## 2024-12-23 DIAGNOSIS — Z87.2 H/O EXCISION OF EPIDERMAL INCLUSION CYST: ICD-10-CM

## 2024-12-23 DIAGNOSIS — Z98.890 H/O EXCISION OF EPIDERMAL INCLUSION CYST: ICD-10-CM

## 2024-12-23 PROCEDURE — 3078F DIAST BP <80 MM HG: CPT | Performed by: SURGERY

## 2024-12-23 PROCEDURE — 3074F SYST BP LT 130 MM HG: CPT | Performed by: SURGERY

## 2024-12-23 PROCEDURE — 99213 OFFICE O/P EST LOW 20 MIN: CPT | Performed by: SURGERY

## 2024-12-23 ASSESSMENT — PATIENT HEALTH QUESTIONNAIRE - PHQ9
2. FEELING DOWN, DEPRESSED OR HOPELESS: NOT AT ALL
1. LITTLE INTEREST OR PLEASURE IN DOING THINGS: NOT AT ALL
SUM OF ALL RESPONSES TO PHQ QUESTIONS 1-9: 0
SUM OF ALL RESPONSES TO PHQ9 QUESTIONS 1 & 2: 0
SUM OF ALL RESPONSES TO PHQ QUESTIONS 1-9: 0

## 2024-12-23 NOTE — PROGRESS NOTES
[facility]      Clinic Note - Follow up    Subjective     Ruth Jeong returns for scheduled follow up today. She underwent excision of right upper arm epidermal inclusion cyst 01/15/2024. She presents for follow up because she developed a keloid at the incision site and it has been very itchy. She says she has Itching from keloid once a week. She says it is not painful. She denies redness/erythema/drainage from the site. She denies fever or chills      Objective     /66 (Site: Left Upper Arm, Position: Sitting, Cuff Size: Large Adult)   Pulse 58   Temp 98.2 °F (36.8 °C) (Oral)   Resp 18   Ht 1.676 m (5' 6\")   Wt 75.4 kg (166 lb 3.2 oz)   SpO2 95%   BMI 26.83 kg/m²       PE  GEN - Awake, alert, communicating appropriately.  NAD  Pulm - NWAB  CV - RRR  Abd - soft, NT, ND.   Right upper arm prior incision site with keloid no sign of infection        Assessment     Ruth Jeong is a 64 y.o.yr old female s/p excision of right upper arm epidermal inclusion cyst 1/15/24 by Dr. Olea now with keloid    Plan     I described all the treatment options for keloids with the patient including topical therapy, injections, and surgical excision with adjuncts like steroids and XRT. I explained the success rate and risk of recurrence of the different options with the patient.     She declined surgery and injections. She wants to start with topical therapy.    Will start with steroid tape and will follow up with her in 6 weeks to see if this is helping patients symptoms         Kathy Sharma MD  12/23/2024    20 mins of time was spent with the patient including reviewing chart, history and physical examination, reviewing labs and imaging and discussing treatment plan with patient.

## 2024-12-27 RX ORDER — ALLANTOIN 5 MG/G
GEL TOPICAL
Status: CANCELLED | OUTPATIENT
Start: 2024-12-27

## 2025-01-09 ENCOUNTER — TELEPHONE (OUTPATIENT)
Age: 65
End: 2025-01-09

## 2025-02-17 ENCOUNTER — TELEPHONE (OUTPATIENT)
Age: 65
End: 2025-02-17

## 2025-02-17 NOTE — TELEPHONE ENCOUNTER
Patient called requesting a sooner appt. She has one 03/11/25. Saw PCP today but was told it is her allergies flaring. I Recommend she take her antihistamine and nasal spray daily. Please advise, thanks. Archie

## 2025-03-11 ENCOUNTER — OFFICE VISIT (OUTPATIENT)
Age: 65
End: 2025-03-11
Payer: MEDICARE

## 2025-03-11 VITALS
HEIGHT: 66 IN | OXYGEN SATURATION: 98 % | SYSTOLIC BLOOD PRESSURE: 100 MMHG | BODY MASS INDEX: 26.71 KG/M2 | DIASTOLIC BLOOD PRESSURE: 62 MMHG | WEIGHT: 166.2 LBS | HEART RATE: 62 BPM | RESPIRATION RATE: 16 BRPM

## 2025-03-11 DIAGNOSIS — K21.9 LARYNGOPHARYNGEAL REFLUX (LPR): ICD-10-CM

## 2025-03-11 DIAGNOSIS — H60.8X3 CHRONIC ECZEMATOUS OTITIS EXTERNA OF BOTH EARS: ICD-10-CM

## 2025-03-11 DIAGNOSIS — R09.81 NASAL CONGESTION: Primary | ICD-10-CM

## 2025-03-11 DIAGNOSIS — K13.0 LIP LESION: ICD-10-CM

## 2025-03-11 DIAGNOSIS — H93.13 BILATERAL TINNITUS: ICD-10-CM

## 2025-03-11 PROCEDURE — 3078F DIAST BP <80 MM HG: CPT | Performed by: OTOLARYNGOLOGY

## 2025-03-11 PROCEDURE — 3074F SYST BP LT 130 MM HG: CPT | Performed by: OTOLARYNGOLOGY

## 2025-03-11 PROCEDURE — 99214 OFFICE O/P EST MOD 30 MIN: CPT | Performed by: OTOLARYNGOLOGY

## 2025-03-11 NOTE — PROGRESS NOTES
Otolaryngology-Head and Neck Surgery  Follow Up Patient Visit     Patient: Ruth Jeong  YOB: 1960  MRN: [id number]  Date of Service:  3/11/2025    Chief Complaint:   Chief Complaint   Patient presents with    Follow-up     Nasal congestion     Interval hx 3/11/2025  Overall doing ok - developed a URI or allergy flare up  Does feel that allegra is helpful  Has developed a small bump on her lip which is bothersome  PCP has suggested valtrex - no improvement, and topical cold sore Rx, no improvement thus far     Interval hx 12/2024  Throat has been doing better consistently for a few months now  Ears itch, she admits not using dermotic but thinks it does probably help    Describes \" shocking\" feeling of her head  Thinks triggers by allergies  Wonders if related to electrolyte abnormalities    Interval hx 8/2024  Throat has been doing better consistently for a few months now  Left ear bothersome  Cont to have itching  Cont to have tinnitus    Interval hx 6/2024  Father just passed away on Saturday  Otherwise has been doing better since addition of PPI     Interval hx 5/2024  Developed sore throat, left sided a few weeks ago  Seen in ER    Interval hx 3/19/2024  Was scheduled for allergy testing but was unable to stop antihistamines    History of Present Illness: Ruth Jeong is a 64 y.o. female who was last seen by Dr. Buckley for tinnitus, allergies and ear canal dermatitis    In the last few weeks she has developed what she feels is an allergy exacerbation  Has nasal congestion, postnasal drip, sore throat    Was seen by her primary care doctor and given a prescription for clindamycin that she was diagnosed with strep throat    She was also given a prescription for Singulair for her allergies but she was not able to tolerate this as it made her feel poorly    As far as allergies go she takes Flonase and Astelin which she finds are helpful. She also takes daily loratadine    With worsening allergy symptoms she

## 2025-03-14 ENCOUNTER — TELEPHONE (OUTPATIENT)
Age: 65
End: 2025-03-14

## 2025-03-14 NOTE — TELEPHONE ENCOUNTER
Patient called stating that her insurance will not cover the fexofenadine.  States that she will have to buy OTC.  Do you want her to stay on this medication and buy OTC or do you want to change to another RX that her insurance will cover.  Please advise...

## 2025-03-17 ENCOUNTER — TELEPHONE (OUTPATIENT)
Age: 65
End: 2025-03-17

## 2025-03-17 RX ORDER — FEXOFENADINE HCL 180 MG/1
180 TABLET ORAL DAILY
Qty: 90 TABLET | Refills: 1 | Status: SHIPPED | OUTPATIENT
Start: 2025-03-17

## 2025-03-17 NOTE — TELEPHONE ENCOUNTER
Patient LVM regarding her allergy medication.    LVM for the patient to contact the office in regards to allergy medication.

## 2025-04-02 ENCOUNTER — TELEPHONE (OUTPATIENT)
Age: 65
End: 2025-04-02

## 2025-04-02 RX ORDER — OMEPRAZOLE 20 MG/1
20 CAPSULE, DELAYED RELEASE ORAL DAILY PRN
Qty: 90 CAPSULE | Refills: 1 | Status: SHIPPED | OUTPATIENT
Start: 2025-04-02

## 2025-04-02 NOTE — TELEPHONE ENCOUNTER
Patient needs a refill for her prescription (omeprazole ). She wanted to know if she should continue this medication.She stated that the medication woks really well for her.

## 2025-05-27 ENCOUNTER — TELEPHONE (OUTPATIENT)
Age: 65
End: 2025-05-27

## 2025-05-27 NOTE — TELEPHONE ENCOUNTER
Good afternoon,    Per pt states having pain, migraines, and loss of appetite due to allergies. Please advise best contact is [phone] (Pt is scheduled 6/10 @10:30 am [place])

## 2025-06-10 ENCOUNTER — OFFICE VISIT (OUTPATIENT)
Age: 65
End: 2025-06-10
Payer: MEDICAID

## 2025-06-10 VITALS
WEIGHT: 166 LBS | DIASTOLIC BLOOD PRESSURE: 80 MMHG | OXYGEN SATURATION: 97 % | HEIGHT: 66 IN | BODY MASS INDEX: 26.68 KG/M2 | SYSTOLIC BLOOD PRESSURE: 122 MMHG | HEART RATE: 74 BPM | RESPIRATION RATE: 18 BRPM

## 2025-06-10 DIAGNOSIS — R09.81 NASAL CONGESTION: Primary | ICD-10-CM

## 2025-06-10 DIAGNOSIS — K21.9 LARYNGOPHARYNGEAL REFLUX (LPR): ICD-10-CM

## 2025-06-10 DIAGNOSIS — H93.13 BILATERAL TINNITUS: ICD-10-CM

## 2025-06-10 DIAGNOSIS — J31.0 NON-ALLERGIC RHINITIS: ICD-10-CM

## 2025-06-10 PROCEDURE — 99214 OFFICE O/P EST MOD 30 MIN: CPT | Performed by: OTOLARYNGOLOGY

## 2025-06-10 PROCEDURE — 3079F DIAST BP 80-89 MM HG: CPT | Performed by: OTOLARYNGOLOGY

## 2025-06-10 PROCEDURE — 1123F ACP DISCUSS/DSCN MKR DOCD: CPT | Performed by: OTOLARYNGOLOGY

## 2025-06-10 PROCEDURE — 3074F SYST BP LT 130 MM HG: CPT | Performed by: OTOLARYNGOLOGY

## 2025-06-10 NOTE — PATIENT INSTRUCTIONS
Next steps   1) Consider more detailed allergy testing - with dedicated allergist - I can arrange a referral     2) Consider sinus CT imaging    3) Consider nasal scope exam (camera in the nose)

## 2025-06-10 NOTE — PROGRESS NOTES
despite negative blood test  - She can let me know how she wants to proceed        The patient was instructed to return to clinic if no improvement or progression of symptoms. Signs to watch out for reviewed.      Johana Hoffman MD   [facility]  [address] [place] [address] [address]  [place] [address]  Office Phone: [phone]

## 2025-06-17 ENCOUNTER — TRANSCRIBE ORDERS (OUTPATIENT)
Facility: HOSPITAL | Age: 65
End: 2025-06-17

## 2025-06-17 DIAGNOSIS — Z12.31 ENCOUNTER FOR SCREENING MAMMOGRAM FOR MALIGNANT NEOPLASM OF BREAST: Primary | ICD-10-CM

## 2025-06-23 ENCOUNTER — TELEPHONE (OUTPATIENT)
Age: 65
End: 2025-06-23

## 2025-06-23 ENCOUNTER — OFFICE VISIT (OUTPATIENT)
Age: 65
End: 2025-06-23
Payer: MEDICAID

## 2025-06-23 VITALS
HEART RATE: 58 BPM | OXYGEN SATURATION: 98 % | BODY MASS INDEX: 26.79 KG/M2 | RESPIRATION RATE: 18 BRPM | DIASTOLIC BLOOD PRESSURE: 72 MMHG | TEMPERATURE: 97.3 F | SYSTOLIC BLOOD PRESSURE: 130 MMHG | HEIGHT: 66 IN

## 2025-06-23 DIAGNOSIS — K59.09 CHRONIC CONSTIPATION: ICD-10-CM

## 2025-06-23 DIAGNOSIS — K21.00 GASTROESOPHAGEAL REFLUX DISEASE WITH ESOPHAGITIS WITHOUT HEMORRHAGE: Primary | ICD-10-CM

## 2025-06-23 DIAGNOSIS — R10.30 LOWER ABDOMINAL PAIN: ICD-10-CM

## 2025-06-23 PROCEDURE — 99214 OFFICE O/P EST MOD 30 MIN: CPT | Performed by: INTERNAL MEDICINE

## 2025-06-23 PROCEDURE — 1123F ACP DISCUSS/DSCN MKR DOCD: CPT | Performed by: INTERNAL MEDICINE

## 2025-06-23 PROCEDURE — 3078F DIAST BP <80 MM HG: CPT | Performed by: INTERNAL MEDICINE

## 2025-06-23 PROCEDURE — 3075F SYST BP GE 130 - 139MM HG: CPT | Performed by: INTERNAL MEDICINE

## 2025-06-23 RX ORDER — FLUTICASONE PROPIONATE 50 MCG
SPRAY, SUSPENSION (ML) NASAL
COMMUNITY
Start: 2025-06-03

## 2025-06-23 RX ORDER — POLYETHYLENE GLYCOL 3350 17 G/17G
POWDER, FOR SOLUTION ORAL
Qty: 1020 G | Refills: 5 | Status: SHIPPED | OUTPATIENT
Start: 2025-06-23

## 2025-06-23 RX ORDER — PANTOPRAZOLE SODIUM 40 MG/1
40 TABLET, DELAYED RELEASE ORAL
Qty: 30 TABLET | Refills: 5 | Status: SHIPPED | OUTPATIENT
Start: 2025-06-23

## 2025-06-23 ASSESSMENT — ENCOUNTER SYMPTOMS
BLOOD IN STOOL: 0
ABDOMINAL PAIN: 1
RECTAL PAIN: 0
ABDOMINAL DISTENTION: 1
DIARRHEA: 0
VOMITING: 0
RESPIRATORY NEGATIVE: 1
NAUSEA: 0
CONSTIPATION: 0
ALLERGIC/IMMUNOLOGIC NEGATIVE: 1
ANAL BLEEDING: 0

## 2025-06-23 NOTE — TELEPHONE ENCOUNTER
Pt. LVM stating she was fed up with the ear noise and headache.  Pt. Wants a referral to see [facility] in [place] at the [facility].

## 2025-06-23 NOTE — PROGRESS NOTES
Ruth Jeong is a 65 y.o. female who presents today for the following:  Chief Complaint   Patient presents with    Follow-up     3 month fup         Allergies   Allergen Reactions    Amoxicillin Hives    Penicillins Hives    Lisinopril Swelling    Montelukast     Nsaids      Other reaction(s): Unable to Obtain       Current Outpatient Medications   Medication Sig Dispense Refill    fluticasone (FLONASE) 50 MCG/ACT nasal spray USE 2 SPRAY(S) IN EACH NOSTRIL ONCE DAILY      polyethylene glycol (GLYCOLAX) 17 GM/SCOOP powder Take 34 gram (2 scoops)  daily in the evening 1020 g 5    pantoprazole (PROTONIX) 40 MG tablet Take 1 tablet by mouth every morning (before breakfast) 30 tablet 5    fexofenadine (ALLEGRA) 180 MG tablet Take 1 tablet by mouth daily 90 tablet 1    vitamin D (CHOLECALCIFEROL) 25 MCG (1000 UT) TABS tablet Take 1 tablet by mouth daily      fluocinolone (DERMOTIC) 0.01 % OIL oil INSTILL 4 DROPS INTO EACH EAR ONCE DAILY AS NEEDED FOR ITCHING      sodium chloride (OCEAN) 0.65 % nasal spray 1 spray by Nasal route as needed for Congestion 1 each 3    Azelastine HCl 137 MCG/SPRAY SOLN USE 1 SPRAY(S) IN EACH NOSTRIL ONCE DAILY AT BEDTIME 30 mL 0    aspirin 81 MG EC tablet Take 1 tablet by mouth daily      amLODIPine (NORVASC) 5 MG tablet Take 1 tablet by mouth daily      atorvastatin (LIPITOR) 80 MG tablet Take 1 tablet by mouth nightly at bedtime      cycloSPORINE (RESTASIS) 0.05 % ophthalmic emulsion INSTILL 1 DROP INTO EACH EYE AT BEDTIME      ezetimibe (ZETIA) 10 MG tablet Take 1 tablet by mouth daily      losartan (COZAAR) 100 MG tablet Take 1 tablet by mouth daily      acarbose (PRECOSE) 100 MG tablet TAKE 1 TABLET BY MOUTH THREE TIMES DAILY WITH THE FIRST BITE OF EACH MAIN MEAL       No current facility-administered medications for this visit.       Past Medical History:   Diagnosis Date    Allergic rhinitis     Allergies     Anxiety disorder     Arthralgia of both hands     Bilateral tinnitus     Bloating

## 2025-06-23 NOTE — PROGRESS NOTES
Chief Complaint   Patient presents with    Follow-up     3 month fup     Have you been to the ER, urgent care clinic since your last visit?  Hospitalized since your last visit?   NO    Have you seen or consulted any other health care providers outside our system since your last visit?   NO     “Have you had a pap smear?”    NO    No cervical cancer screening on file       “Have you had a colorectal cancer screening such as a colonoscopy/FIT/Cologuard?    NO    No colonoscopy on file  No cologuard on file  No FIT/FOBT on file   No flexible sigmoidoscopy on file

## 2025-06-26 DIAGNOSIS — J31.0 NON-ALLERGIC RHINITIS: ICD-10-CM

## 2025-06-26 DIAGNOSIS — R09.81 NASAL CONGESTION: Primary | ICD-10-CM

## 2025-07-30 ENCOUNTER — HOSPITAL ENCOUNTER (EMERGENCY)
Facility: HOSPITAL | Age: 65
Discharge: HOME | End: 2025-07-30
Attending: EMERGENCY MEDICINE
Payer: MEDICARE

## 2025-07-30 VITALS
OXYGEN SATURATION: 97 % | HEART RATE: 67 BPM | SYSTOLIC BLOOD PRESSURE: 129 MMHG | DIASTOLIC BLOOD PRESSURE: 65 MMHG | TEMPERATURE: 97.8 F | RESPIRATION RATE: 18 BRPM

## 2025-07-30 DIAGNOSIS — K08.89 PAIN, DENTAL: Primary | ICD-10-CM

## 2025-07-30 PROCEDURE — 6370000000 HC RX 637 (ALT 250 FOR IP): Performed by: EMERGENCY MEDICINE

## 2025-07-30 PROCEDURE — 99283 EMERGENCY DEPT VISIT LOW MDM: CPT

## 2025-07-30 RX ORDER — LIDOCAINE HYDROCHLORIDE 20 MG/ML
15 SOLUTION OROPHARYNGEAL
Status: DISCONTINUED | OUTPATIENT
Start: 2025-07-30 | End: 2025-07-30 | Stop reason: HOSPADM

## 2025-07-30 RX ORDER — CLINDAMYCIN HYDROCHLORIDE 300 MG/1
300 CAPSULE ORAL 2 TIMES DAILY
Qty: 14 CAPSULE | Refills: 0 | Status: SHIPPED | OUTPATIENT
Start: 2025-07-30 | End: 2025-08-06

## 2025-07-30 RX ORDER — DIPHENHYDRAMINE HCL 12.5 MG/5ML
25 SOLUTION ORAL EVERY 6 HOURS PRN
Status: DISCONTINUED | OUTPATIENT
Start: 2025-07-30 | End: 2025-07-30 | Stop reason: HOSPADM

## 2025-07-30 RX ADMIN — TOPICAL ANESTHETIC: 200 SPRAY DENTAL; PERIODONTAL at 16:07

## 2025-07-30 ASSESSMENT — PAIN SCALES - GENERAL: PAINLEVEL_OUTOF10: 5

## 2025-07-30 NOTE — ED TRIAGE NOTES
PT reports left lower gum irritation and pain x 2 days. Reports upcoming dental appointment on 8/7.

## 2025-07-30 NOTE — ED PROVIDER NOTES
Topics    Alcohol use: Not Currently    Drug use: Never       Allergies:  Allergies   Allergen Reactions    Amoxicillin Hives    Penicillins Hives    Lisinopril Swelling    Montelukast     Nsaids      Other reaction(s): Unable to Obtain       PCP: Christophe Graff Sr., MD    Current Meds:   Current Facility-Administered Medications   Medication Dose Route Frequency Provider Last Rate Last Admin    lidocaine viscous hcl (XYLOCAINE) 2 % solution 15 mL  15 mL Mouth/Throat NOW Radha Ulloa MD        diphenhydrAMINE (BENYLIN) 12.5 MG/5ML liquid 25 mg  25 mg Oral Q6H PRN Radha Ulloa MD        butamben-tetracaine-benzocaine (CETACAINE) spray 3 spray  3 spray Topical Once Radha Ulloa MD        benzocaine (HURRICAINE) 20 % oral spray   Mouth/Throat 4x Daily PRN Radha Ulloa MD         Current Outpatient Medications   Medication Sig Dispense Refill    clindamycin (CLEOCIN) 300 MG capsule Take 1 capsule by mouth 2 times daily for 7 days 14 capsule 0    fluticasone (FLONASE) 50 MCG/ACT nasal spray USE 2 SPRAY(S) IN EACH NOSTRIL ONCE DAILY      polyethylene glycol (GLYCOLAX) 17 GM/SCOOP powder Take 34 gram (2 scoops)  daily in the evening 1020 g 5    pantoprazole (PROTONIX) 40 MG tablet Take 1 tablet by mouth every morning (before breakfast) 30 tablet 5    fexofenadine (ALLEGRA) 180 MG tablet Take 1 tablet by mouth daily 90 tablet 1    vitamin D (CHOLECALCIFEROL) 25 MCG (1000 UT) TABS tablet Take 1 tablet by mouth daily      fluocinolone (DERMOTIC) 0.01 % OIL oil INSTILL 4 DROPS INTO EACH EAR ONCE DAILY AS NEEDED FOR ITCHING      sodium chloride (OCEAN) 0.65 % nasal spray 1 spray by Nasal route as needed for Congestion 1 each 3    Azelastine HCl 137 MCG/SPRAY SOLN USE 1 SPRAY(S) IN EACH NOSTRIL ONCE DAILY AT BEDTIME 30 mL 0    aspirin 81 MG EC tablet Take 1 tablet by mouth daily      amLODIPine (NORVASC) 5 MG tablet Take 1 tablet by mouth daily      atorvastatin (LIPITOR) 80 MG tablet Take 1 tablet by mouth

## 2025-08-28 RX ORDER — FEXOFENADINE HCL 180 MG/1
180 TABLET ORAL DAILY
Qty: 90 TABLET | Refills: 0 | Status: SHIPPED | OUTPATIENT
Start: 2025-08-28

## (undated) DEVICE — SUTURE VCRL SZ 3-0 L27IN ABSRB UD L26MM SH 1/2 CIR J416H

## (undated) DEVICE — 1200CC GUARDIAN II: Brand: GUARDIAN

## (undated) DEVICE — HYPODERMIC SAFETY NEEDLE: Brand: MAGELLAN

## (undated) DEVICE — SPONGE GZ 4X4 IN 16-PLY DETECTABLE W/ DMT MSTR TAG

## (undated) DEVICE — MAGNETIC INSTR DRAPE 20X16: Brand: MEDLINE INDUSTRIES, INC.

## (undated) DEVICE — ELECTRODE PT RET AD L9FT HI MOIST COND ADH HYDRGEL CORDED

## (undated) DEVICE — HYPODERMIC SAFETY NEEDLE: Brand: MONOJECT

## (undated) DEVICE — SMARTSLEEVE SURGICAL GOWN, 3XL LONG: Brand: CONVERTORS

## (undated) DEVICE — DRAPE,MINOR PROC,6X6 FEN, STER: Brand: MEDLINE

## (undated) DEVICE — STERILE POLYISOPRENE POWDER-FREE SURGICAL GLOVES WITH EMOLLIENT COATING: Brand: PROTEXIS

## (undated) DEVICE — APPLICATOR MEDICATED 26 CC SOLUTION HI LT ORNG CHLORAPREP

## (undated) DEVICE — SPONGE LAP SOFT 18X18 IN X RAY DETECTABLE

## (undated) DEVICE — SOLUTION IRRIG 1000ML 0.9% SOD CHL USP POUR PLAS BTL

## (undated) DEVICE — SOLUTION IRRIG 1000ML STRL H2O USP PLAS POUR BTL

## (undated) DEVICE — CRADLE POS 3X5X24IN RASPBERRY ARM PRONE FOAM DISP

## (undated) DEVICE — SMARTGOWN SURGICAL GOWN, LARGE: Brand: CONVERTORS

## (undated) DEVICE — PACK,SET-UP: Brand: MEDLINE

## (undated) DEVICE — TOWEL,OR,DSP,ST,BLUE,STD,4/PK,20PK/CS: Brand: MEDLINE

## (undated) DEVICE — SUTURE MCRYL + SZ 4-0 L27IN ABSRB UD L19MM PS-2 3/8 CIR MCP426H

## (undated) DEVICE — YANKAUER,BULB TIP,W/O VENT,RIGID,STERILE: Brand: MEDLINE

## (undated) DEVICE — SKIN MARKER,REGULAR TIP WITH RULER AND LABELS: Brand: DEVON

## (undated) DEVICE — TUBING, SUCTION, 3/16" X 10', SCALLOP: Brand: MEDLINE

## (undated) DEVICE — GLOVE ORANGE PI 7 1/2   MSG9075

## (undated) DEVICE — SYRINGE IRRIG 60ML SFT PLIABLE BLB EZ TO GRP 1 HND USE W/

## (undated) DEVICE — SYRINGE MED 10ML LUERLOCK TIP W/O SFTY DISP

## (undated) DEVICE — SYRINGE,EAR/ULCER, 2 OZ, STERILE: Brand: MEDLINE

## (undated) DEVICE — DEVON TUBE HOLDER FIXED TOUCH FASTEN STRAP: Brand: DEVON

## (undated) DEVICE — SMARTGOWN SURGICAL GOWN, XL, LONG: Brand: CONVERTORS

## (undated) DEVICE — DRAPE,REIN 53X77,STERILE: Brand: MEDLINE

## (undated) DEVICE — BLADE ES ELASTOMERIC COAT INSUL DURABLE BEND UPTO 90DEG

## (undated) DEVICE — BLADE,CARBON-STEEL,15,STRL,DISPOSABLE,TB: Brand: MEDLINE

## (undated) DEVICE — COUNTER NDL 40 COUNT HLD 70 FOAM BLK ADH W/ MAG

## (undated) DEVICE — COAGULATOR ELECSURG BLADE 10 FTX1 IN PTFE STRL ULTRACLEAN